# Patient Record
Sex: MALE | Race: BLACK OR AFRICAN AMERICAN | HISPANIC OR LATINO | Employment: UNEMPLOYED | URBAN - METROPOLITAN AREA
[De-identification: names, ages, dates, MRNs, and addresses within clinical notes are randomized per-mention and may not be internally consistent; named-entity substitution may affect disease eponyms.]

---

## 2017-01-01 ENCOUNTER — HOSPITAL ENCOUNTER (EMERGENCY)
Facility: HOSPITAL | Age: 27
Discharge: HOME | End: 2017-01-01
Attending: EMERGENCY MEDICINE | Admitting: EMERGENCY MEDICINE
Payer: COMMERCIAL

## 2017-01-01 VITALS
TEMPERATURE: 98.1 F | WEIGHT: 237.22 LBS | HEART RATE: 85 BPM | DIASTOLIC BLOOD PRESSURE: 89 MMHG | OXYGEN SATURATION: 98 % | RESPIRATION RATE: 16 BRPM | SYSTOLIC BLOOD PRESSURE: 137 MMHG

## 2017-01-01 DIAGNOSIS — J01.90 ACUTE SINUSITIS: Primary | ICD-10-CM

## 2017-01-01 PROCEDURE — 99283 EMERGENCY DEPT VISIT LOW MDM: CPT

## 2017-01-01 RX ORDER — GUAIFENESIN 600 MG
600 TABLET, EXTENDED RELEASE 12 HR ORAL 2 TIMES DAILY
Qty: 10 TABLET | Refills: 0 | Status: SHIPPED | OUTPATIENT
Start: 2017-01-01 | End: 2017-01-06

## 2017-01-01 RX ORDER — NAPROXEN 250 MG/1
250 TABLET ORAL
Qty: 90 TABLET | Refills: 0 | Status: SHIPPED | OUTPATIENT
Start: 2017-01-01 | End: 2017-07-25 | Stop reason: ALTCHOICE

## 2017-01-01 RX ORDER — AZITHROMYCIN 250 MG/1
TABLET, FILM COATED ORAL
Qty: 6 TABLET | Refills: 0 | Status: SHIPPED | OUTPATIENT
Start: 2017-01-01 | End: 2017-07-25 | Stop reason: ALTCHOICE

## 2017-07-25 ENCOUNTER — HOSPITAL ENCOUNTER (EMERGENCY)
Facility: HOSPITAL | Age: 27
Discharge: HOME | End: 2017-07-25
Attending: EMERGENCY MEDICINE | Admitting: EMERGENCY MEDICINE
Payer: COMMERCIAL

## 2017-07-25 VITALS
SYSTOLIC BLOOD PRESSURE: 151 MMHG | DIASTOLIC BLOOD PRESSURE: 83 MMHG | RESPIRATION RATE: 18 BRPM | WEIGHT: 230 LBS | TEMPERATURE: 97.9 F | HEART RATE: 83 BPM | OXYGEN SATURATION: 98 %

## 2017-07-25 DIAGNOSIS — R51.9 HEADACHE: Primary | ICD-10-CM

## 2017-07-25 DIAGNOSIS — H60.509 OTITIS EXTERNA; ACUTE: ICD-10-CM

## 2017-07-25 PROCEDURE — 96361 HYDRATE IV INFUSION ADD-ON: CPT

## 2017-07-25 PROCEDURE — 99283 EMERGENCY DEPT VISIT LOW MDM: CPT

## 2017-07-25 PROCEDURE — 96375 TX/PRO/DX INJ NEW DRUG ADDON: CPT

## 2017-07-25 PROCEDURE — 96374 THER/PROPH/DIAG INJ IV PUSH: CPT

## 2017-07-25 RX ORDER — KETOROLAC TROMETHAMINE 30 MG/ML
15 INJECTION, SOLUTION INTRAMUSCULAR; INTRAVENOUS ONCE
Status: COMPLETED | OUTPATIENT
Start: 2017-07-25 | End: 2017-07-25

## 2017-07-25 RX ORDER — METOCLOPRAMIDE HYDROCHLORIDE 5 MG/ML
10 INJECTION INTRAMUSCULAR; INTRAVENOUS ONCE
Status: COMPLETED | OUTPATIENT
Start: 2017-07-25 | End: 2017-07-25

## 2017-07-25 RX ORDER — CIPROFLOXACIN AND DEXAMETHASONE 3; 1 MG/ML; MG/ML
4 SUSPENSION/ DROPS AURICULAR (OTIC) ONCE
Status: COMPLETED | OUTPATIENT
Start: 2017-07-25 | End: 2017-07-25

## 2017-07-25 RX ORDER — NAPROXEN 500 MG/1
500 TABLET ORAL 2 TIMES DAILY WITH MEALS
Qty: 20 TABLET | Refills: 0 | Status: SHIPPED | OUTPATIENT
Start: 2017-07-25 | End: 2018-08-15 | Stop reason: ALTCHOICE

## 2017-07-25 RX ORDER — DIPHENHYDRAMINE HYDROCHLORIDE 50 MG/ML
25 INJECTION INTRAMUSCULAR; INTRAVENOUS ONCE
Status: COMPLETED | OUTPATIENT
Start: 2017-07-25 | End: 2017-07-25

## 2017-07-25 RX ADMIN — DIPHENHYDRAMINE HYDROCHLORIDE 25 MG: 50 INJECTION, SOLUTION INTRAMUSCULAR; INTRAVENOUS at 00:53

## 2017-07-25 RX ADMIN — METOCLOPRAMIDE 10 MG: 5 INJECTION, SOLUTION INTRAMUSCULAR; INTRAVENOUS at 00:58

## 2017-07-25 RX ADMIN — SODIUM CHLORIDE 1000 ML: 0.9 INJECTION, SOLUTION INTRAVENOUS at 00:51

## 2017-07-25 RX ADMIN — KETOROLAC TROMETHAMINE 15 MG: 30 INJECTION, SOLUTION INTRAMUSCULAR at 00:52

## 2017-07-25 RX ADMIN — CIPROFLOXACIN AND DEXAMETHASONE 4 DROP: 3; 1 SUSPENSION/ DROPS AURICULAR (OTIC) at 01:37

## 2018-08-15 ENCOUNTER — HOSPITAL ENCOUNTER (EMERGENCY)
Facility: HOSPITAL | Age: 28
Discharge: HOME | End: 2018-08-15
Attending: EMERGENCY MEDICINE
Payer: COMMERCIAL

## 2018-08-15 VITALS
OXYGEN SATURATION: 100 % | RESPIRATION RATE: 16 BRPM | WEIGHT: 242 LBS | HEART RATE: 68 BPM | DIASTOLIC BLOOD PRESSURE: 55 MMHG | SYSTOLIC BLOOD PRESSURE: 106 MMHG | TEMPERATURE: 97.8 F

## 2018-08-15 DIAGNOSIS — G43.909 MIGRAINE HEADACHE: Primary | ICD-10-CM

## 2018-08-15 LAB
ALBUMIN SERPL BCP-MCNC: 4 G/DL (ref 3.5–5)
ALP SERPL-CCNC: 56 U/L (ref 46–116)
ALT SERPL W P-5'-P-CCNC: 33 U/L (ref 12–78)
ANION GAP SERPL CALCULATED.3IONS-SCNC: 7 MMOL/L (ref 4–13)
AST SERPL W P-5'-P-CCNC: 15 U/L (ref 5–45)
BASOPHILS # BLD AUTO: 0.04 THOUSANDS/ΜL (ref 0–0.1)
BASOPHILS NFR BLD AUTO: 1 % (ref 0–1)
BILIRUB SERPL-MCNC: 0.44 MG/DL (ref 0.2–1)
BUN SERPL-MCNC: 12 MG/DL (ref 5–25)
CALCIUM SERPL-MCNC: 9.1 MG/DL (ref 8.3–10.1)
CHLORIDE SERPL-SCNC: 103 MMOL/L (ref 100–108)
CO2 SERPL-SCNC: 28 MMOL/L (ref 21–32)
CREAT SERPL-MCNC: 0.71 MG/DL (ref 0.6–1.3)
EOSINOPHIL # BLD AUTO: 0.21 THOUSAND/ΜL (ref 0–0.61)
EOSINOPHIL NFR BLD AUTO: 3 % (ref 0–6)
ERYTHROCYTE [DISTWIDTH] IN BLOOD BY AUTOMATED COUNT: 12.8 % (ref 11.6–15.1)
GFR SERPL CREATININE-BSD FRML MDRD: 127 ML/MIN/1.73SQ M
GLUCOSE SERPL-MCNC: 93 MG/DL (ref 65–140)
HCT VFR BLD AUTO: 47.3 % (ref 36.5–49.3)
HGB BLD-MCNC: 16.4 G/DL (ref 12–17)
LIPASE SERPL-CCNC: 146 U/L (ref 73–393)
LYMPHOCYTES # BLD AUTO: 1.68 THOUSANDS/ΜL (ref 0.6–4.47)
LYMPHOCYTES NFR BLD AUTO: 24 % (ref 14–44)
MCH RBC QN AUTO: 31.5 PG (ref 26.8–34.3)
MCHC RBC AUTO-ENTMCNC: 34.7 G/DL (ref 31.4–37.4)
MCV RBC AUTO: 91 FL (ref 82–98)
MONOCYTES # BLD AUTO: 0.42 THOUSAND/ΜL (ref 0.17–1.22)
MONOCYTES NFR BLD AUTO: 6 % (ref 4–12)
NEUTROPHILS # BLD AUTO: 4.77 THOUSANDS/ΜL (ref 1.85–7.62)
NEUTS SEG NFR BLD AUTO: 67 % (ref 43–75)
NRBC BLD AUTO-RTO: 0 /100 WBCS
PLATELET # BLD AUTO: 242 THOUSANDS/UL (ref 149–390)
PMV BLD AUTO: 10.6 FL (ref 8.9–12.7)
POTASSIUM SERPL-SCNC: 4 MMOL/L (ref 3.5–5.3)
PROT SERPL-MCNC: 7.7 G/DL (ref 6.4–8.2)
RBC # BLD AUTO: 5.2 MILLION/UL (ref 3.88–5.62)
SODIUM SERPL-SCNC: 138 MMOL/L (ref 136–145)
WBC # BLD AUTO: 7.12 THOUSAND/UL (ref 4.31–10.16)

## 2018-08-15 PROCEDURE — 96361 HYDRATE IV INFUSION ADD-ON: CPT

## 2018-08-15 PROCEDURE — 96374 THER/PROPH/DIAG INJ IV PUSH: CPT

## 2018-08-15 PROCEDURE — 83690 ASSAY OF LIPASE: CPT | Performed by: EMERGENCY MEDICINE

## 2018-08-15 PROCEDURE — 85025 COMPLETE CBC W/AUTO DIFF WBC: CPT | Performed by: EMERGENCY MEDICINE

## 2018-08-15 PROCEDURE — 99284 EMERGENCY DEPT VISIT MOD MDM: CPT

## 2018-08-15 PROCEDURE — 80053 COMPREHEN METABOLIC PANEL: CPT | Performed by: EMERGENCY MEDICINE

## 2018-08-15 PROCEDURE — 96375 TX/PRO/DX INJ NEW DRUG ADDON: CPT

## 2018-08-15 PROCEDURE — 36415 COLL VENOUS BLD VENIPUNCTURE: CPT | Performed by: EMERGENCY MEDICINE

## 2018-08-15 RX ORDER — PANTOPRAZOLE SODIUM 40 MG/1
40 TABLET, DELAYED RELEASE ORAL 2 TIMES DAILY
COMMUNITY
End: 2019-05-16

## 2018-08-15 RX ORDER — DIPHENHYDRAMINE HYDROCHLORIDE 50 MG/ML
25 INJECTION INTRAMUSCULAR; INTRAVENOUS ONCE
Status: COMPLETED | OUTPATIENT
Start: 2018-08-15 | End: 2018-08-15

## 2018-08-15 RX ORDER — METOCLOPRAMIDE HYDROCHLORIDE 5 MG/ML
10 INJECTION INTRAMUSCULAR; INTRAVENOUS ONCE
Status: COMPLETED | OUTPATIENT
Start: 2018-08-15 | End: 2018-08-15

## 2018-08-15 RX ORDER — OLANZAPINE 5 MG/1
10 TABLET, ORALLY DISINTEGRATING ORAL ONCE
Status: COMPLETED | OUTPATIENT
Start: 2018-08-15 | End: 2018-08-15

## 2018-08-15 RX ORDER — SUCRALFATE ORAL 1 G/10ML
1000 SUSPENSION ORAL ONCE
Status: COMPLETED | OUTPATIENT
Start: 2018-08-15 | End: 2018-08-15

## 2018-08-15 RX ORDER — KETOROLAC TROMETHAMINE 30 MG/ML
15 INJECTION, SOLUTION INTRAMUSCULAR; INTRAVENOUS ONCE
Status: COMPLETED | OUTPATIENT
Start: 2018-08-15 | End: 2018-08-15

## 2018-08-15 RX ADMIN — METOCLOPRAMIDE 10 MG: 5 INJECTION, SOLUTION INTRAMUSCULAR; INTRAVENOUS at 13:27

## 2018-08-15 RX ADMIN — DIPHENHYDRAMINE HYDROCHLORIDE 25 MG: 50 INJECTION, SOLUTION INTRAMUSCULAR; INTRAVENOUS at 13:31

## 2018-08-15 RX ADMIN — KETOROLAC TROMETHAMINE 15 MG: 30 INJECTION, SOLUTION INTRAMUSCULAR at 13:30

## 2018-08-15 RX ADMIN — SUCRALFATE 1000 MG: 1 SUSPENSION ORAL at 13:33

## 2018-08-15 RX ADMIN — SODIUM CHLORIDE 1000 ML: 0.9 INJECTION, SOLUTION INTRAVENOUS at 13:20

## 2018-08-15 RX ADMIN — OLANZAPINE 10 MG: 5 TABLET, ORALLY DISINTEGRATING ORAL at 13:32

## 2018-08-15 NOTE — ED PROVIDER NOTES
History  Chief Complaint   Patient presents with    Abdominal Pain     Sharp abdominal pains with nausea  Hx gastritis, but meds not working    Migraine     Migraine posterior headache since yesterday       History provided by:  Patient   used: No    Headache - Recurrent or Known Dx Migraines   Pain location:  Frontal and occipital (BL frontal)  Quality:  Dull  Radiates to:  Does not radiate  Severity currently:  7/10  Severity at highest:  7/10  Onset quality:  Gradual  Duration:  1 day  Timing:  Constant  Progression:  Worsening  Chronicity:  New  Similar to prior headaches: yes    Relieved by:  Nothing  Worsened by:  Nothing  Ineffective treatments:  None tried  Associated symptoms: abdominal pain and nausea    Associated symptoms: no back pain, no cough, no diarrhea, no dizziness, no eye pain, no fatigue, no fever, no neck pain, no neck stiffness, no numbness, no sore throat and no vomiting        Prior to Admission Medications   Prescriptions Last Dose Informant Patient Reported? Taking? pantoprazole (PROTONIX) 40 mg tablet   Yes Yes   Sig: Take 40 mg by mouth 2 (two) times a day      Facility-Administered Medications: None       Past Medical History:   Diagnosis Date    Gastritis     Migraine        History reviewed  No pertinent surgical history  Family History   Problem Relation Age of Onset    Family history unknown: Yes     I have reviewed and agree with the history as documented  Social History   Substance Use Topics    Smoking status: Never Smoker    Smokeless tobacco: Never Used    Alcohol use No        Review of Systems   Constitutional: Negative for activity change, appetite change, diaphoresis, fatigue and fever  HENT: Negative for sore throat and trouble swallowing  Eyes: Negative for pain and visual disturbance  Respiratory: Negative for cough, chest tightness and shortness of breath  Cardiovascular: Negative for chest pain     Gastrointestinal: Positive for abdominal pain and nausea  Negative for diarrhea and vomiting  Endocrine: Negative for polyphagia and polyuria  Genitourinary: Negative for dysuria, flank pain, frequency, hematuria and urgency  Musculoskeletal: Negative for back pain, gait problem, neck pain and neck stiffness  Skin: Negative for color change and rash  Allergic/Immunologic: Negative for immunocompromised state  Neurological: Positive for headaches  Negative for dizziness, speech difficulty and numbness  Hematological: Does not bruise/bleed easily  Psychiatric/Behavioral: Negative for confusion and decreased concentration  Physical Exam  Physical Exam   Constitutional: He is oriented to person, place, and time  He appears well-developed and well-nourished  HENT:   Head: Normocephalic  Eyes: Conjunctivae and EOM are normal  Pupils are equal, round, and reactive to light  No scleral icterus  Neck: Normal range of motion  Neck supple  Cardiovascular: Normal rate and regular rhythm  Pulmonary/Chest: Effort normal and breath sounds normal  No respiratory distress  Abdominal: Soft  Bowel sounds are normal  He exhibits no distension  There is no tenderness  There is no rebound and no guarding  Musculoskeletal: Normal range of motion  He exhibits no tenderness or deformity  Lymphadenopathy:     He has no cervical adenopathy  Neurological: He is alert and oriented to person, place, and time  Coordination normal    GCS 15, nonfocal  5/5 strength in all four extremities  Normal cerebellar testing  Ambulates without difficulty  Skin: Skin is warm and dry  He is not diaphoretic  Psychiatric: He has a normal mood and affect  Vitals reviewed        Vital Signs  ED Triage Vitals   Temperature Pulse Respirations Blood Pressure SpO2   08/15/18 1241 08/15/18 1241 08/15/18 1241 08/15/18 1241 08/15/18 1241   98 2 °F (36 8 °C) 65 16 147/96 100 %      Temp Source Heart Rate Source Patient Position - Orthostatic VS BP Location FiO2 (%)   08/15/18 1241 08/15/18 1500 08/15/18 1400 08/15/18 1400 --   Oral Monitor Lying Left arm       Pain Score       08/15/18 1241       Worst Possible Pain           Vitals:    08/15/18 1241 08/15/18 1400 08/15/18 1500   BP: 147/96 132/72 106/55   Pulse: 65 69 68   Patient Position - Orthostatic VS:  Lying Lying       Visual Acuity      ED Medications  Medications   sodium chloride 0 9 % bolus 1,000 mL (0 mL Intravenous Stopped 8/15/18 1507)   ketorolac (TORADOL) injection 15 mg (15 mg Intravenous Given 8/15/18 1330)   metoclopramide (REGLAN) injection 10 mg (10 mg Intravenous Given 8/15/18 1327)   diphenhydrAMINE (BENADRYL) injection 25 mg (25 mg Intravenous Given 8/15/18 1331)   sucralfate (CARAFATE) oral suspension 1,000 mg (1,000 mg Oral Given 8/15/18 1333)   OLANZapine (ZyPREXA ZYDIS) dispersible tablet 10 mg (10 mg Oral Given 8/15/18 1332)       Diagnostic Studies  Results Reviewed     Procedure Component Value Units Date/Time    Comprehensive metabolic panel [[id number]] Collected:  08/15/18 1315    Lab Status:  Final result Specimen:  Blood from Arm, Right Updated:  08/15/18 1349     Sodium 138 mmol/L      Potassium 4 0 mmol/L      Chloride 103 mmol/L      CO2 28 mmol/L      Anion Gap 7 mmol/L      BUN 12 mg/dL      Creatinine 0 71 mg/dL      Glucose 93 mg/dL      Calcium 9 1 mg/dL      AST 15 U/L      ALT 33 U/L      Alkaline Phosphatase 56 U/L      Total Protein 7 7 g/dL      Albumin 4 0 g/dL      Total Bilirubin 0 44 mg/dL      eGFR 127 ml/min/1 73sq m     Narrative:         National Kidney Disease Education Program recommendations are as follows:  GFR calculation is accurate only with a steady state creatinine  Chronic Kidney disease less than 60 ml/min/1 73 sq  meters  Kidney failure less than 15 ml/min/1 73 sq  meters      Lipase [[id number]]  (Normal) Collected:  08/15/18 1315    Lab Status:  Final result Specimen:  Blood from Arm, Right Updated:  08/15/18 1349     Lipase 146 u/L CBC and differential [[id number]] Collected:  08/15/18 1315    Lab Status:  Final result Specimen:  Blood from Arm, Right Updated:  08/15/18 1329     WBC 7 12 Thousand/uL      RBC 5 20 Million/uL      Hemoglobin 16 4 g/dL      Hematocrit 47 3 %      MCV 91 fL      MCH 31 5 pg      MCHC 34 7 g/dL      RDW 12 8 %      MPV 10 6 fL      Platelets 704 Thousands/uL      nRBC 0 /100 WBCs      Neutrophils Relative 67 %      Lymphocytes Relative 24 %      Monocytes Relative 6 %      Eosinophils Relative 3 %      Basophils Relative 1 %      Neutrophils Absolute 4 77 Thousands/µL      Lymphocytes Absolute 1 68 Thousands/µL      Monocytes Absolute 0 42 Thousand/µL      Eosinophils Absolute 0 21 Thousand/µL      Basophils Absolute 0 04 Thousands/µL                  No orders to display              Procedures  Procedures       Phone Contacts  ED Phone Contact    ED Course  ED Course as of Aug 15 1549   Wed Aug 15, 2018   1509 Patient re-evaluated  Reports that headache is now 1/10 in intensity  Abdominal discomfort much improved  Minimal nausea  Continues to have a normal neurologic exam and benign abdominal exam   Patient requesting to be discharged  Will oblige  Recommend PCP follow-up and return for new or worsening symptoms  MDM  Number of Diagnoses or Management Options  Migraine headache: new and requires workup  Diagnosis management comments: 32 y/o male presents to the emergency department for evaluation of multiple complaints  The patient states that starting last evening around 18:00 he began to have a gradual onset posterior headache with some radiation to his frontal areas bilaterally  Patient states that he has a long history of migraine headaches and that his current symptoms including abdominal pain, nausea, and photosensitivity ER consistent with previous episodes of migraine    Patient states that he has had multiple headaches in the past that were "10 times worse" that his current symptoms  Also describes mild cramping pain in the epigastrium that he has been experiencing intermittently for some time  He is followed by GI and recently had EGD and is taking daily PPIs  No fevers  No emesis  No diarrhea  43-year-old male presented for the above  He had a normal neurologic exam   Has history of similar headaches in the past   No "red flags" on history or physical exam   Laboratory studies were conducted to rule out biliary obstructive disease or pancreatitis  The patient was treated symptomatically in the emergency department and had excellent relief of his symptoms  Will discharge to follow up with PCP       Amount and/or Complexity of Data Reviewed  Clinical lab tests: reviewed and ordered  Tests in the radiology section of CPT®: reviewed and ordered  Review and summarize past medical records: yes      CritCare Time    Disposition  Final diagnoses:   Migraine headache     Time reflects when diagnosis was documented in both MDM as applicable and the Disposition within this note     Time User Action Codes Description Comment    8/15/2018  3:01 PM Jasson Vasquez [G43 [id number]] Migraine headache       ED Disposition     ED Disposition Condition Comment    Discharge  Reese Mealy discharge to home/self care  Condition at discharge: Good        Follow-up Information     Follow up With Specialties Details Why [address], [place] Family Medicine  please follow up with your primary care provider in 3-5 days or sooner if your symptoms persist  If you have new or worsening symptoms please call your doctor or return to the emergency department  [facility] [address]            Discharge Medication List as of 8/15/2018  3:03 PM      CONTINUE these medications which have NOT CHANGED    Details   pantoprazole (PROTONIX) 40 mg tablet Take 40 mg by mouth 2 (two) times a day, Historical Med           No discharge procedures on file      ED Provider  Electronically Signed by           Hattie Sloan MD  08/15/18 7513

## 2018-08-15 NOTE — DISCHARGE INSTRUCTIONS
Migraine Headache   WHAT YOU SHOULD KNOW:   A migraine is a severe headache  The pain can be so severe that it interferes with your daily activities  A migraine can last a few hours up to several days  The exact cause of migraines is not known  It may be caused by changes in your body chemicals and extra sensitive nerves in your brain  AFTER YOU LEAVE:   Medicines:  Take medicine as soon as you feel a migraine begin  · Pain medicine: You may need medicine to take away or decrease pain  You may need a doctor's order for this medicine  Do not wait until the pain is severe before you take your medicine  · Migraine medicines: These are used to help prevent a migraine or stop it once it starts  · Antinausea medicine: This medicine may be given to calm your stomach and to help prevent vomiting  They can also help relieve pain  · Take your medicine as directed  Call your healthcare provider if you think your medicine is not helping or if you have side effects  Tell him if you are allergic to any medicine  Keep a list of the medicines, vitamins, and herbs you take  Include the amounts, and when and why you take them  Bring the list or the pill bottles to follow-up visits  Carry your medicine list with you in case of an emergency  Manage your symptoms:   · Rest:  Rest in a dark, quiet room  This will help decrease your pain  · Ice:  Ice helps decrease pain  Use an ice pack or put crushed ice in a plastic bag  Cover the ice pack with a towel and place it on your head where it hurts for 15 to 20 minutes every hour  · Heat:  Heat helps decrease pain and muscle spasms  Use a small towel dampened with warm water or a heating pad, or sit in a warm bath  Apply heat on the area for 20 to 30 minutes every 2 hours  You may alternate heat and ice  Keep a headache diary:  Write down when your migraines start and stop  Include your symptoms and what you were doing when a migraine began   Record what you ate or drank for 24 hours before the migraine started  Describe the pain and where it hurts  Keep track of what you did to treat your migraine and whether it worked  Follow up with your primary healthcare provider or neurologist as directed:  Bring your headache diary with you when you see your primary healthcare provider  Write down your questions so you remember to ask them during your visits  Prevent another migraine:   · Do not smoke: If you smoke, it is never too late to quit  Tobacco smoke can trigger a migraine  It can also cause heart disease, lung disease, cancer, and other health problems  Quitting smoking will improve your health and the health of those around you  If you smoke, ask for information about how to stop  · Do not drink alcohol:  Alcohol can trigger a migraine  It can also interfere with the medicines used to treat your migraine  · Get regular exercise:  Exercise may help prevent migraines  Talk to your primary healthcare provider about the best exercise plan for you  · Manage stress:  Stress may trigger a migraine  Learn new ways to relax, such as deep breathing  · Stick to a sleep schedule:  Go to bed and get up at the same time each day  · Eat regular meals:  Include healthy foods such as include fruit, vegetables, whole-grain breads, low-fat dairy products, beans, lean meat, and fish  Avoid trigger foods like chocolate, hard cheese, and red wine  Foods that contain gluten, nitrates, MSG, or artificial sweeteners may also trigger migraines  Caffeine, which is often used to treat migraines, can also trigger them  Contact your primary healthcare provider or neurologist if:   · You have a fever  · Your migraines interfere with your daily activities  · Your medicines or treatments stop working  · You have questions about your condition or care    Seek care immediately or call 911 if:   · You have a headache that seems different or much worse than your usual migraine headache  · You have a severe headache with a fever or a stiff neck  · You have new problems with speech, vision, balance, or movement  · You feel like you are going to faint, you become confused, or you have a seizure  © 2014 [address] is for End User's use only and may not be sold, redistributed or otherwise used for commercial purposes  All illustrations and images included in CareNotes® are the copyrighted property of A D A M [employer] Inc  or Ryan Hutson  The above information is an  only  It is not intended as medical advice for individual conditions or treatments  Talk to your doctor, nurse or pharmacist before following any medical regimen to see if it is safe and effective for you

## 2019-05-16 ENCOUNTER — APPOINTMENT (EMERGENCY)
Dept: RADIOLOGY | Facility: HOSPITAL | Age: 29
End: 2019-05-16
Payer: COMMERCIAL

## 2019-05-16 ENCOUNTER — HOSPITAL ENCOUNTER (EMERGENCY)
Facility: HOSPITAL | Age: 29
Discharge: HOME | End: 2019-05-16
Attending: EMERGENCY MEDICINE | Admitting: EMERGENCY MEDICINE
Payer: COMMERCIAL

## 2019-05-16 VITALS
OXYGEN SATURATION: 95 % | HEART RATE: 116 BPM | RESPIRATION RATE: 18 BRPM | DIASTOLIC BLOOD PRESSURE: 59 MMHG | SYSTOLIC BLOOD PRESSURE: 115 MMHG | TEMPERATURE: 97.6 F

## 2019-05-16 DIAGNOSIS — J45.909 ASTHMA: ICD-10-CM

## 2019-05-16 DIAGNOSIS — J20.9 ACUTE BRONCHITIS: Primary | ICD-10-CM

## 2019-05-16 PROCEDURE — 94640 AIRWAY INHALATION TREATMENT: CPT

## 2019-05-16 PROCEDURE — 96374 THER/PROPH/DIAG INJ IV PUSH: CPT

## 2019-05-16 PROCEDURE — 71046 X-RAY EXAM CHEST 2 VIEWS: CPT

## 2019-05-16 PROCEDURE — 99283 EMERGENCY DEPT VISIT LOW MDM: CPT

## 2019-05-16 PROCEDURE — 96361 HYDRATE IV INFUSION ADD-ON: CPT

## 2019-05-16 PROCEDURE — 99284 EMERGENCY DEPT VISIT MOD MDM: CPT | Performed by: EMERGENCY MEDICINE

## 2019-05-16 RX ORDER — GUAIFENESIN 600 MG
600 TABLET, EXTENDED RELEASE 12 HR ORAL 2 TIMES DAILY
Qty: 10 TABLET | Refills: 0 | Status: SHIPPED | OUTPATIENT
Start: 2019-05-16 | End: 2019-05-21

## 2019-05-16 RX ORDER — FLUTICASONE PROPIONATE 50 MCG
1 SPRAY, SUSPENSION (ML) NASAL DAILY
Qty: 16 G | Refills: 0 | Status: SHIPPED | OUTPATIENT
Start: 2019-05-16

## 2019-05-16 RX ORDER — IPRATROPIUM BROMIDE AND ALBUTEROL SULFATE 2.5; .5 MG/3ML; MG/3ML
3 SOLUTION RESPIRATORY (INHALATION) ONCE
Status: COMPLETED | OUTPATIENT
Start: 2019-05-16 | End: 2019-05-16

## 2019-05-16 RX ORDER — ALBUTEROL SULFATE 2.5 MG/3ML
5 SOLUTION RESPIRATORY (INHALATION) ONCE
Status: COMPLETED | OUTPATIENT
Start: 2019-05-16 | End: 2019-05-16

## 2019-05-16 RX ORDER — KETOROLAC TROMETHAMINE 30 MG/ML
15 INJECTION, SOLUTION INTRAMUSCULAR; INTRAVENOUS ONCE
Status: COMPLETED | OUTPATIENT
Start: 2019-05-16 | End: 2019-05-16

## 2019-05-16 RX ORDER — ALBUTEROL SULFATE 90 UG/1
2 AEROSOL, METERED RESPIRATORY (INHALATION) EVERY 6 HOURS PRN
COMMUNITY

## 2019-05-16 RX ORDER — ALBUTEROL SULFATE 2.5 MG/3ML
SOLUTION RESPIRATORY (INHALATION)
Status: COMPLETED
Start: 2019-05-16 | End: 2019-05-16

## 2019-05-16 RX ADMIN — KETOROLAC TROMETHAMINE 15 MG: 30 INJECTION, SOLUTION INTRAMUSCULAR; INTRAVENOUS at 10:17

## 2019-05-16 RX ADMIN — IPRATROPIUM BROMIDE 0.5 MG: 0.5 SOLUTION RESPIRATORY (INHALATION) at 09:33

## 2019-05-16 RX ADMIN — SODIUM CHLORIDE 1000 ML: 0.9 INJECTION, SOLUTION INTRAVENOUS at 10:17

## 2019-05-16 RX ADMIN — ALBUTEROL SULFATE 5 MG: 2.5 SOLUTION RESPIRATORY (INHALATION) at 09:33

## 2019-05-16 RX ADMIN — IPRATROPIUM BROMIDE AND ALBUTEROL SULFATE 3 ML: 2.5; .5 SOLUTION RESPIRATORY (INHALATION) at 10:11

## 2019-05-16 RX ADMIN — Medication 0.5 MG: at 09:33

## 2020-01-06 ENCOUNTER — HOSPITAL ENCOUNTER (EMERGENCY)
Facility: HOSPITAL | Age: 30
Discharge: HOME | End: 2020-01-06
Attending: EMERGENCY MEDICINE | Admitting: EMERGENCY MEDICINE
Payer: COMMERCIAL

## 2020-01-06 VITALS
RESPIRATION RATE: 18 BRPM | HEART RATE: 84 BPM | WEIGHT: 225.53 LBS | TEMPERATURE: 97.6 F | SYSTOLIC BLOOD PRESSURE: 120 MMHG | DIASTOLIC BLOOD PRESSURE: 77 MMHG | OXYGEN SATURATION: 98 %

## 2020-01-06 DIAGNOSIS — R11.2 NAUSEA AND VOMITING: ICD-10-CM

## 2020-01-06 DIAGNOSIS — R10.9 ABDOMINAL PAIN: Primary | ICD-10-CM

## 2020-01-06 DIAGNOSIS — R19.7 DIARRHEA: ICD-10-CM

## 2020-01-06 LAB
BILIRUB UR QL STRIP: NEGATIVE
CLARITY UR: CLEAR
COLOR UR: YELLOW
COLOR, POC: YELLOW
GLUCOSE UR STRIP-MCNC: NEGATIVE MG/DL
HGB UR QL STRIP.AUTO: NEGATIVE
KETONES UR STRIP-MCNC: NEGATIVE MG/DL
LEUKOCYTE ESTERASE UR QL STRIP: NEGATIVE
NITRITE UR QL STRIP: NEGATIVE
PH UR STRIP.AUTO: 7.5 [PH] (ref 4.5–8)
PROT UR STRIP-MCNC: NEGATIVE MG/DL
SP GR UR STRIP.AUTO: 1.02 (ref 1–1.03)
UROBILINOGEN UR QL STRIP.AUTO: 0.2 E.U./DL

## 2020-01-06 PROCEDURE — 99284 EMERGENCY DEPT VISIT MOD MDM: CPT | Performed by: EMERGENCY MEDICINE

## 2020-01-06 PROCEDURE — 99284 EMERGENCY DEPT VISIT MOD MDM: CPT

## 2020-01-06 PROCEDURE — 81003 URINALYSIS AUTO W/O SCOPE: CPT

## 2020-01-06 RX ORDER — DICYCLOMINE HCL 20 MG
20 TABLET ORAL ONCE
Status: COMPLETED | OUTPATIENT
Start: 2020-01-06 | End: 2020-01-06

## 2020-01-06 RX ORDER — PANTOPRAZOLE SODIUM 40 MG/1
40 TABLET, DELAYED RELEASE ORAL DAILY
COMMUNITY

## 2020-01-06 RX ORDER — ONDANSETRON 2 MG/ML
4 INJECTION INTRAMUSCULAR; INTRAVENOUS ONCE
Status: COMPLETED | OUTPATIENT
Start: 2020-01-06 | End: 2020-01-06

## 2020-01-06 RX ORDER — DICYCLOMINE HCL 20 MG
20 TABLET ORAL 2 TIMES DAILY
Qty: 20 TABLET | Refills: 0 | Status: SHIPPED | OUTPATIENT
Start: 2020-01-06 | End: 2021-07-18

## 2020-01-06 RX ORDER — HALOPERIDOL 5 MG/ML
5 INJECTION INTRAMUSCULAR ONCE
Status: COMPLETED | OUTPATIENT
Start: 2020-01-06 | End: 2020-01-06

## 2020-01-06 RX ORDER — ONDANSETRON 4 MG/1
4 TABLET, ORALLY DISINTEGRATING ORAL EVERY 6 HOURS PRN
Qty: 20 TABLET | Refills: 0 | Status: SHIPPED | OUTPATIENT
Start: 2020-01-06 | End: 2021-07-18

## 2020-01-06 RX ADMIN — HALOPERIDOL LACTATE 5 MG: 5 INJECTION INTRAMUSCULAR at 13:31

## 2020-01-06 RX ADMIN — FAMOTIDINE 20 MG: 10 INJECTION, SOLUTION INTRAVENOUS at 11:27

## 2020-01-06 RX ADMIN — ONDANSETRON 4 MG: 2 INJECTION INTRAMUSCULAR; INTRAVENOUS at 11:26

## 2020-01-06 RX ADMIN — SODIUM CHLORIDE 1000 ML: 0.9 INJECTION, SOLUTION INTRAVENOUS at 11:25

## 2020-01-06 RX ADMIN — DICYCLOMINE HYDROCHLORIDE 20 MG: 20 TABLET ORAL at 13:32

## 2020-01-06 NOTE — ED PROVIDER NOTES
History  Chief Complaint   Patient presents with    Abdominal Pain     pt c/o sharp abd pain for 2 days, reports vomiting, diarrhea, and subjective fever  pt taking protonix without relief  This is a 58-year-old male with a history of gastritis and asthma who presents with nausea/vomiting, diarrhea, abdominal pain  The patient states that for the past several years, he has been suffering from intermittent epigastric abdominal pain associated with nonbloody, nonbilious vomiting  The patient states that he has seen GI in the past and received an EGD which was unremarkable  He is currently taking Protonix  Starting this morning, he started to experience innumerable episodes of nonbloody, nonbilious vomiting and innumerable episodes of nonbloody, nonmelanotic diarrhea  This is associated with abdominal cramping  The patient states that his girlfriend has been experiencing similar symptoms  She is actually in the emergency department at this point for evaluation as well  The patient has not spoken with his gastroenterologist or his family doctor regarding his ongoing epigastric abdominal pain  Denies fever/chills, lightheadedness/dizziness, numbness/weakness, headache, change in vision, URI symptoms, neck pain, chest pain, palpitations, shortness of breath, cough, back pain, flank pain, hematochezia, melena, dysuria, hematuria  Prior to Admission Medications   Prescriptions Last Dose Informant Patient Reported? Taking?    albuterol (PROVENTIL HFA,VENTOLIN HFA) 90 mcg/act inhaler   Yes Yes   Sig: Inhale 2 puffs every 6 (six) hours as needed for wheezing   fluticasone (FLONASE) 50 mcg/act nasal spray   No Yes   Si spray into each nostril daily   pantoprazole (PROTONIX) 40 mg tablet 2020 at Unknown time  Yes Yes   Sig: Take 40 mg by mouth daily      Facility-Administered Medications: None       Past Medical History:   Diagnosis Date    Asthma     Gastritis     Migraine        Past Surgical History:   Procedure Laterality Date    WRIST SURGERY         Family History   Family history unknown: Yes     I have reviewed and agree with the history as documented  Social History     Tobacco Use    Smoking status: Current Some Day Smoker    Smokeless tobacco: Never Used   Substance Use Topics    Alcohol use: Yes    Drug use: No        Review of Systems   Constitutional: Negative for chills, fatigue and fever  HENT: Negative for rhinorrhea, sore throat and trouble swallowing  Eyes: Negative for photophobia and visual disturbance  Respiratory: Negative for cough, chest tightness and shortness of breath  Cardiovascular: Negative for chest pain, palpitations and leg swelling  Gastrointestinal: Positive for abdominal pain, diarrhea, nausea and vomiting  Negative for blood in stool  Endocrine: Negative for polyuria  Genitourinary: Negative for dysuria, flank pain and hematuria  Musculoskeletal: Negative for back pain and neck pain  Skin: Negative for color change and rash  Allergic/Immunologic: Negative for immunocompromised state  Neurological: Negative for dizziness, weakness, light-headedness, numbness and headaches  All other systems reviewed and are negative  Physical Exam  Physical Exam   Constitutional: Vital signs are normal  He appears well-developed and well-nourished  He is cooperative  No distress  HENT:   Mouth/Throat: Uvula is midline and oropharynx is clear and moist    Eyes: Pupils are equal, round, and reactive to light  Conjunctivae, EOM and lids are normal    Neck: Trachea normal  No thyroid mass and no thyromegaly present  Cardiovascular: Normal rate, regular rhythm, normal heart sounds, intact distal pulses and normal pulses  No murmur heard  Pulmonary/Chest: Effort normal and breath sounds normal    Abdominal: Soft  Normal appearance and bowel sounds are normal  There is generalized tenderness   There is no rebound, no guarding, no CVA tenderness and negative Lux's sign  Mild, generalized abdominal tenderness  Neurological: He is alert  Skin: Skin is warm, dry and intact  Psychiatric: He has a normal mood and affect   His speech is normal and behavior is normal  Thought content normal        Vital Signs  ED Triage Vitals   Temperature Pulse Respirations Blood Pressure SpO2   01/06/20 1046 01/06/20 1045 01/06/20 1045 01/06/20 1045 01/06/20 1045   97 6 °F (36 4 °C) 79 18 148/86 99 %      Temp Source Heart Rate Source Patient Position - Orthostatic VS BP Location FiO2 (%)   01/06/20 1046 01/06/20 1045 01/06/20 1045 01/06/20 1045 --   Temporal Monitor Sitting Right arm       Pain Score       01/06/20 1201       3           Vitals:    01/06/20 1201 01/06/20 1329 01/06/20 1409 01/06/20 1448   BP: 122/58 110/69 109/78 120/77   Pulse: 78 68 86 84   Patient Position - Orthostatic VS: Lying Lying Lying Lying         Visual Acuity      ED Medications  Medications   sodium chloride 0 9 % bolus 1,000 mL (0 mL Intravenous Stopped 1/6/20 1250)   ondansetron (ZOFRAN) injection 4 mg (4 mg Intravenous Given 1/6/20 1126)   famotidine (PEPCID) injection 20 mg (20 mg Intravenous Given 1/6/20 1127)   dicyclomine (BENTYL) tablet 20 mg (20 mg Oral Given 1/6/20 1332)   haloperidol lactate (HALDOL) injection 5 mg (5 mg Intramuscular Given 1/6/20 1331)       Diagnostic Studies  Results Reviewed     Procedure Component Value Units Date/Time    POCT urinalysis dipstick [[id number]]  (Normal) Resulted:  01/06/20 1113    Lab Status:  Final result Updated:  01/06/20 1113     Color, UA Yellow    Urine Macroscopic, POC [[id number]] Collected:  01/06/20 1107    Lab Status:  Final result Specimen:  Urine Updated:  01/06/20 1108     Color, UA Yellow     Clarity, UA Clear     pH, UA 7 5     Leukocytes, UA Negative     Nitrite, UA Negative     Protein, UA Negative mg/dl      Glucose, UA Negative mg/dl      Ketones, UA Negative mg/dl      Urobilinogen, UA 0 2 E U /dl      Bilirubin, UA Negative     Blood, UA Negative     Specific Gravity, UA 1 020    Narrative:       CLINITEK RESULT                 No orders to display              Procedures  Procedures         ED Course  ED Course as of Jan 06 1532   Mon Jan 06, 2020   1324 Patient still complaining of abdominal pain  Will give bentyl and haldol  [facility] patient  Sleeping comfortably  States that he feels much better  Will discharge the patient with Zofran and Bentyl for his symptoms  Work note  Patient instructed on the importance of following up with Gastroenterology  Told to continue the Protonix as prescribed  Strict return precautions given  MDM  Number of Diagnoses or Management Options  Diagnosis management comments: Likely a viral gastroenteritis given the patient's symptoms and sick contact  Plan to treat the patient symptomatically with fluids and Zofran  Pepcid for his history of gastritis  The patient needs to follow up with his gastroenterologist and family doctor regarding his chronic abdominal pain  Disposition  Final diagnoses:   Abdominal pain   Nausea and vomiting   Diarrhea     Time reflects when diagnosis was documented in both MDM as applicable and the Disposition within this note     Time User Action Codes Description Comment    1/6/2020  2:44 PM Nicolasa FERRARA Add [R10 9] Abdominal pain     1/6/2020  2:44 PM Nicolasa FERRARA Add [R11 2] Nausea and vomiting     1/6/2020  2:44 PM Alysha Lazo Add [R19 7] Diarrhea       ED Disposition     ED Disposition Condition Date/Time Comment    Discharge Stable Mon Jan 6, 2020  2:44 PM Harris Cabot discharge to home/self care              Follow-up Information     Follow up With Specialties Details Why Contact Info Additional Information    Salima Earl MD Family Medicine Schedule an appointment as soon as possible for a visit   [address] [place] [address] [facility] Emergency Department Emergency Medicine Go to  If symptoms worsen [place] [id number]  [phone] [place] [facility], [address]  , [place], [place], [address] [facility] Gastroenterology Schedule an appointment as soon as possible for a visit   [address]  [address] [facility] [address]  Alisha Joseph [address] [facility], [address], [address], [place], [place], [address] [phone]    [employer]  Call  if you need help finding gastroenterologist [phone]             Discharge Medication List as of 1/6/2020  2:46 PM      START taking these medications    Details   dicyclomine (BENTYL) 20 mg tablet Take 1 tablet (20 mg total) by mouth 2 (two) times a day, Starting Mon 1/6/2020, Normal      ondansetron (ZOFRAN-ODT) 4 mg disintegrating tablet Take 1 tablet (4 mg total) by mouth every 6 (six) hours as needed for nausea, Starting Mon 1/6/2020, Normal         CONTINUE these medications which have NOT CHANGED    Details   albuterol (PROVENTIL HFA,VENTOLIN HFA) 90 mcg/act inhaler Inhale 2 puffs every 6 (six) hours as needed for wheezing, Historical Med      fluticasone (FLONASE) 50 mcg/act nasal spray 1 spray into each nostril daily, Starting u 5/16/2019, Print      pantoprazole (PROTONIX) 40 mg tablet Take 40 mg by mouth daily, Historical Med           No discharge procedures on file      ED Provider  Electronically Signed by           Iam Lagunas MD  01/06/20 3702

## 2021-05-20 ENCOUNTER — HOSPITAL ENCOUNTER (EMERGENCY)
Facility: HOSPITAL | Age: 31
Discharge: HOME | End: 2021-05-20
Attending: EMERGENCY MEDICINE

## 2021-05-20 VITALS
RESPIRATION RATE: 16 BRPM | HEIGHT: 70 IN | OXYGEN SATURATION: 100 % | WEIGHT: 207.23 LBS | HEART RATE: 70 BPM | DIASTOLIC BLOOD PRESSURE: 81 MMHG | TEMPERATURE: 98.2 F | BODY MASS INDEX: 29.67 KG/M2 | SYSTOLIC BLOOD PRESSURE: 141 MMHG

## 2021-05-20 DIAGNOSIS — K13.79 ORAL PAIN: ICD-10-CM

## 2021-05-20 DIAGNOSIS — K11.6 SALIVARY MUCOCELE OR RANULA: ICD-10-CM

## 2021-05-20 DIAGNOSIS — K13.70 ORAL LESION: Primary | ICD-10-CM

## 2021-05-20 PROCEDURE — 99282 EMERGENCY DEPT VISIT SF MDM: CPT | Performed by: PHYSICIAN ASSISTANT

## 2021-05-20 PROCEDURE — 99282 EMERGENCY DEPT VISIT SF MDM: CPT

## 2021-05-21 NOTE — ED PROVIDER NOTES
History  Chief Complaint   Patient presents with    Oral Pain     Pt c/o blister under his tongue for 1 week     27-year-old male with no relevant past medical history who presents to the emergency department for complaint of oral lesion noticed 1 week ago  Patient denies experiencing this before  He reports a "bubble" on the right side floor of mouth, describes swelling as waxing and waning, denies bleeding or discharge  Further denies any difficulty or painful swallowing, other oral lesions, fever/chills, nausea/vomiting, difficulty breathing  Prior to Admission Medications   Prescriptions Last Dose Informant Patient Reported? Taking? albuterol (PROVENTIL HFA,VENTOLIN HFA) 90 mcg/act inhaler   Yes No   Sig: Inhale 2 puffs every 6 (six) hours as needed for wheezing   dicyclomine (BENTYL) 20 mg tablet   No No   Sig: Take 1 tablet (20 mg total) by mouth 2 (two) times a day   fluticasone (FLONASE) 50 mcg/act nasal spray   No No   Si spray into each nostril daily   ondansetron (ZOFRAN-ODT) 4 mg disintegrating tablet   No No   Sig: Take 1 tablet (4 mg total) by mouth every 6 (six) hours as needed for nausea   pantoprazole (PROTONIX) 40 mg tablet   Yes No   Sig: Take 40 mg by mouth daily      Facility-Administered Medications: None       Past Medical History:   Diagnosis Date    Asthma     Gastritis     Migraine        Past Surgical History:   Procedure Laterality Date    WRIST SURGERY         Family History   Family history unknown: Yes     I have reviewed and agree with the history as documented  E-Cigarette/Vaping     E-Cigarette/Vaping Substances     Social History     Tobacco Use    Smoking status: Current Some Day Smoker    Smokeless tobacco: Never Used   Substance Use Topics    Alcohol use: Yes    Drug use: No       Review of Systems   Constitutional: Negative for appetite change, chills, fatigue and fever  HENT: Positive for mouth sores   Negative for dental problem, drooling, facial swelling, sore throat, trouble swallowing and voice change  Gastrointestinal: Negative for nausea and vomiting  Musculoskeletal: Negative for neck pain and neck stiffness  Skin: Negative for color change and rash  Neurological: Negative for dizziness, weakness, light-headedness and headaches  Hematological: Negative for adenopathy  All other systems reviewed and are negative  Physical Exam  Physical Exam  Vitals signs reviewed  Constitutional:       General: He is awake  He is not in acute distress  Appearance: Normal appearance  He is well-developed  He is not ill-appearing or toxic-appearing  HENT:      Head: Normocephalic and atraumatic  Mouth/Throat:      Lips: Pink  No lesions  Mouth: Mucous membranes are moist  No injury, lacerations, oral lesions or angioedema  Dentition: Normal dentition  No gum lesions  Tongue: No lesions  Tongue does not deviate from midline  Palate: Lesions present  No mass  Pharynx: Oropharynx is clear  Uvula midline  Tonsils: No tonsillar exudate or tonsillar abscesses  0 on the right  0 on the left  Comments: Airway grossly patent; pea sized focal area of well demarcated mucosal swelling consistent with sublingual ranula on right bottom hard palate, no bleeding or discharge, no pus on floor or mouth or surrounding cellulitic changes  Eyes:      Extraocular Movements: Extraocular movements intact  Conjunctiva/sclera: Conjunctivae normal       Pupils: Pupils are equal, round, and reactive to light  Neck:      Musculoskeletal: Full passive range of motion without pain, normal range of motion and neck supple  Cardiovascular:      Rate and Rhythm: Normal rate and regular rhythm  Pulses: Normal pulses  Pulmonary:      Effort: Pulmonary effort is normal       Breath sounds: Normal breath sounds and air entry  Musculoskeletal: Normal range of motion  Skin:     General: Skin is warm        Capillary Refill: Capillary refill takes less than 2 seconds  Findings: No erythema, lesion or rash  Neurological:      Mental Status: He is alert and oriented to person, place, and time  Psychiatric:         Behavior: Behavior is cooperative  Vital Signs  ED Triage Vitals [05/20/21 2042]   Temperature Pulse Respirations Blood Pressure SpO2   98 2 °F (36 8 °C) 70 16 141/81 100 %      Temp Source Heart Rate Source Patient Position - Orthostatic VS BP Location FiO2 (%)   Oral Monitor -- -- --      Pain Score       6           Vitals:    05/20/21 2042   BP: 141/81   Pulse: 70         Visual Acuity      ED Medications  Medications - No data to display    Diagnostic Studies  Results Reviewed     None                 No orders to display              Procedures  Procedures         ED Course                             SBIRT 20yo+      Most Recent Value   SBIRT (24 yo +)   In order to provide better care to our patients, we are screening all of our patients for alcohol and drug use  Would it be okay to ask you these screening questions? No Filed at: 05/20/2021 2115                    MDM  Number of Diagnoses or Management Options  Oral lesion:   Oral pain:   Salivary mucocele or ranula:   Diagnosis management comments: Exam consistent with sublingual ranula  Discussed motrin for discomfort, warm salt water gargles, sour lozenges to promote salivation, and f/u with OMFS for further evaluation  Amount and/or Complexity of Data Reviewed  Decide to obtain previous medical records or to obtain history from someone other than the patient: yes  Obtain history from someone other than the patient: yes  Review and summarize past medical records: yes  Discuss the patient with other providers: yes    Patient Progress  Patient progress: stable (I discussed emergency department return parameters  I answered any and all questions the patient had regarding emergency department course of evaluation and treatment   The patient verbalized understanding of and agreement with plan   )      Disposition  Final diagnoses:   Oral lesion   Oral pain   Salivary mucocele or ranula     Time reflects when diagnosis was documented in both MDM as applicable and the Disposition within this note     Time User Action Codes Description Comment    5/20/2021  9:16 PM Lillian Bucy Add [K13 70] Oral lesion     5/20/2021  9:16 PM Lillian Bucy Add [K13 79] Oral pain     5/20/2021  9:16 PM Lillian Bucy Add [K11 6] Salivary mucocele or ranula       ED Disposition     ED Disposition Condition Date/Time Comment    Discharge Stable Thu May 20, 2021  9:11 PM Renard Vasquez discharge to home/self care              Follow-up Information     Follow up With Specialties Details Why [address] [facility] Emergency Department Emergency Medicine Go to  If symptoms worsen [facility] [address]  [address] [facility] Emergency Department, [address] [facility] , [facility], [facility], [place] [address] for Oral and Maxillofacial Surgery [facility]  Call in 1 day For further evaluation [address] [facility]  [place]           Discharge Medication List as of 5/20/2021  9:19 PM      CONTINUE these medications which have NOT CHANGED    Details   albuterol (PROVENTIL HFA,VENTOLIN HFA) 90 mcg/act inhaler Inhale 2 puffs every 6 (six) hours as needed for wheezing, Historical Med      dicyclomine (BENTYL) 20 mg tablet Take 1 tablet (20 mg total) by mouth 2 (two) times a day, Starting Mon 1/6/2020, Normal      fluticasone (FLONASE) 50 mcg/act nasal spray 1 spray into each nostril daily, Starting Thu 5/16/2019, Print      ondansetron (ZOFRAN-ODT) 4 mg disintegrating tablet Take 1 tablet (4 mg total) by mouth every 6 (six) hours as needed for nausea, Starting Mon 1/6/2020, Normal      pantoprazole (PROTONIX) 40 mg tablet Take 40 mg by mouth daily, Historical Med           No discharge procedures on file      PDMP Review     None          ED Provider  Electronically Signed by           Kathrine Buckner PA-C  05/20/21 2325

## 2021-06-09 ENCOUNTER — HOSPITAL ENCOUNTER (EMERGENCY)
Facility: HOSPITAL | Age: 31
Discharge: HOME | End: 2021-06-09
Attending: EMERGENCY MEDICINE | Admitting: EMERGENCY MEDICINE

## 2021-06-09 ENCOUNTER — APPOINTMENT (EMERGENCY)
Dept: CT IMAGING | Facility: HOSPITAL | Age: 31
End: 2021-06-09

## 2021-06-09 VITALS
SYSTOLIC BLOOD PRESSURE: 141 MMHG | RESPIRATION RATE: 19 BRPM | HEART RATE: 60 BPM | DIASTOLIC BLOOD PRESSURE: 94 MMHG | OXYGEN SATURATION: 100 % | TEMPERATURE: 97.5 F

## 2021-06-09 DIAGNOSIS — R19.7 DIARRHEA: ICD-10-CM

## 2021-06-09 DIAGNOSIS — R10.9 ABDOMINAL PAIN: Primary | ICD-10-CM

## 2021-06-09 DIAGNOSIS — R11.2 NAUSEA AND VOMITING: ICD-10-CM

## 2021-06-09 LAB
ALBUMIN SERPL BCP-MCNC: 4.4 G/DL (ref 3.5–5)
ALP SERPL-CCNC: 69 U/L (ref 46–116)
ALT SERPL W P-5'-P-CCNC: 70 U/L (ref 12–78)
ANION GAP SERPL CALCULATED.3IONS-SCNC: 13 MMOL/L (ref 4–13)
AST SERPL W P-5'-P-CCNC: 71 U/L (ref 5–45)
BASOPHILS # BLD AUTO: 0.09 THOUSANDS/ΜL (ref 0–0.1)
BASOPHILS NFR BLD AUTO: 1 % (ref 0–1)
BILIRUB SERPL-MCNC: 0.56 MG/DL (ref 0.2–1)
BUN SERPL-MCNC: 20 MG/DL (ref 5–25)
CALCIUM SERPL-MCNC: 9.7 MG/DL (ref 8.3–10.1)
CHLORIDE SERPL-SCNC: 102 MMOL/L (ref 100–108)
CO2 SERPL-SCNC: 26 MMOL/L (ref 21–32)
CREAT SERPL-MCNC: 0.78 MG/DL (ref 0.6–1.3)
EOSINOPHIL # BLD AUTO: 0.66 THOUSAND/ΜL (ref 0–0.61)
EOSINOPHIL NFR BLD AUTO: 9 % (ref 0–6)
ERYTHROCYTE [DISTWIDTH] IN BLOOD BY AUTOMATED COUNT: 12.4 % (ref 11.6–15.1)
GFR SERPL CREATININE-BSD FRML MDRD: 138 ML/MIN/1.73SQ M
GLUCOSE SERPL-MCNC: 106 MG/DL (ref 65–140)
HCT VFR BLD AUTO: 48.4 % (ref 36.5–49.3)
HGB BLD-MCNC: 16.5 G/DL (ref 12–17)
IMM GRANULOCYTES # BLD AUTO: 0.02 THOUSAND/UL (ref 0–0.2)
IMM GRANULOCYTES NFR BLD AUTO: 0 % (ref 0–2)
LIPASE SERPL-CCNC: 83 U/L (ref 73–393)
LYMPHOCYTES # BLD AUTO: 2.45 THOUSANDS/ΜL (ref 0.6–4.47)
LYMPHOCYTES NFR BLD AUTO: 32 % (ref 14–44)
MCH RBC QN AUTO: 31.2 PG (ref 26.8–34.3)
MCHC RBC AUTO-ENTMCNC: 34.1 G/DL (ref 31.4–37.4)
MCV RBC AUTO: 92 FL (ref 82–98)
MONOCYTES # BLD AUTO: 0.51 THOUSAND/ΜL (ref 0.17–1.22)
MONOCYTES NFR BLD AUTO: 7 % (ref 4–12)
NEUTROPHILS # BLD AUTO: 3.94 THOUSANDS/ΜL (ref 1.85–7.62)
NEUTS SEG NFR BLD AUTO: 51 % (ref 43–75)
NRBC BLD AUTO-RTO: 0 /100 WBCS
PLATELET # BLD AUTO: 269 THOUSANDS/UL (ref 149–390)
PMV BLD AUTO: 10.5 FL (ref 8.9–12.7)
POTASSIUM SERPL-SCNC: 3.7 MMOL/L (ref 3.5–5.3)
PROT SERPL-MCNC: 8.3 G/DL (ref 6.4–8.2)
RBC # BLD AUTO: 5.29 MILLION/UL (ref 3.88–5.62)
SODIUM SERPL-SCNC: 141 MMOL/L (ref 136–145)
WBC # BLD AUTO: 7.67 THOUSAND/UL (ref 4.31–10.16)

## 2021-06-09 PROCEDURE — 99284 EMERGENCY DEPT VISIT MOD MDM: CPT | Performed by: EMERGENCY MEDICINE

## 2021-06-09 PROCEDURE — 83690 ASSAY OF LIPASE: CPT | Performed by: EMERGENCY MEDICINE

## 2021-06-09 PROCEDURE — G1004 CDSM NDSC: HCPCS

## 2021-06-09 PROCEDURE — 99284 EMERGENCY DEPT VISIT MOD MDM: CPT

## 2021-06-09 PROCEDURE — 80053 COMPREHEN METABOLIC PANEL: CPT | Performed by: EMERGENCY MEDICINE

## 2021-06-09 PROCEDURE — 96375 TX/PRO/DX INJ NEW DRUG ADDON: CPT

## 2021-06-09 PROCEDURE — 74177 CT ABD & PELVIS W/CONTRAST: CPT

## 2021-06-09 PROCEDURE — 96374 THER/PROPH/DIAG INJ IV PUSH: CPT

## 2021-06-09 PROCEDURE — 36415 COLL VENOUS BLD VENIPUNCTURE: CPT | Performed by: EMERGENCY MEDICINE

## 2021-06-09 PROCEDURE — 85025 COMPLETE CBC W/AUTO DIFF WBC: CPT | Performed by: EMERGENCY MEDICINE

## 2021-06-09 PROCEDURE — 96361 HYDRATE IV INFUSION ADD-ON: CPT

## 2021-06-09 RX ORDER — ONDANSETRON 4 MG/1
4 TABLET, ORALLY DISINTEGRATING ORAL EVERY 6 HOURS PRN
Qty: 20 TABLET | Refills: 0 | Status: SHIPPED | OUTPATIENT
Start: 2021-06-09 | End: 2021-07-18

## 2021-06-09 RX ORDER — DICYCLOMINE HCL 20 MG
20 TABLET ORAL 2 TIMES DAILY
Qty: 20 TABLET | Refills: 0 | Status: SHIPPED | OUTPATIENT
Start: 2021-06-09 | End: 2021-07-18

## 2021-06-09 RX ORDER — KETOROLAC TROMETHAMINE 30 MG/ML
15 INJECTION, SOLUTION INTRAMUSCULAR; INTRAVENOUS ONCE
Status: COMPLETED | OUTPATIENT
Start: 2021-06-09 | End: 2021-06-09

## 2021-06-09 RX ORDER — METOCLOPRAMIDE HYDROCHLORIDE 5 MG/ML
10 INJECTION INTRAMUSCULAR; INTRAVENOUS ONCE
Status: COMPLETED | OUTPATIENT
Start: 2021-06-09 | End: 2021-06-09

## 2021-06-09 RX ADMIN — IOHEXOL 100 ML: 350 INJECTION, SOLUTION INTRAVENOUS at 15:18

## 2021-06-09 RX ADMIN — METOCLOPRAMIDE 10 MG: 5 INJECTION, SOLUTION INTRAMUSCULAR; INTRAVENOUS at 14:27

## 2021-06-09 RX ADMIN — KETOROLAC TROMETHAMINE 15 MG: 30 INJECTION, SOLUTION INTRAMUSCULAR; INTRAVENOUS at 14:27

## 2021-06-09 RX ADMIN — SODIUM CHLORIDE 1000 ML: 0.9 INJECTION, SOLUTION INTRAVENOUS at 14:27

## 2021-06-09 NOTE — ED PROVIDER NOTES
History  Chief Complaint   Patient presents with    Abdominal Pain     Pt reports RLQ abdominal pain that began 15 minutes  Pt is moaning in triage  Pt reports nausea, denies doing anything when pain began       27 y o  M p/w RLQ pain x 1 week  Intermittent, sharp  Associated with N/V/D  Denies F/C, urinary complaints, h/o abd surgeries, h/o kidney stones  History provided by:  Patient   used: No    Abdominal Pain  Pain location:  RLQ  Pain quality: sharp    Duration:  1 week  Timing:  Intermittent  Progression:  Unchanged  Chronicity:  New  Context: not previous surgeries    Relieved by:  None tried  Worsened by:  Nothing  Ineffective treatments:  None tried  Associated symptoms: diarrhea, nausea and vomiting    Associated symptoms: no dysuria and no fever        Prior to Admission Medications   Prescriptions Last Dose Informant Patient Reported? Taking? albuterol (PROVENTIL HFA,VENTOLIN HFA) 90 mcg/act inhaler   Yes No   Sig: Inhale 2 puffs every 6 (six) hours as needed for wheezing   dicyclomine (BENTYL) 20 mg tablet   No No   Sig: Take 1 tablet (20 mg total) by mouth 2 (two) times a day   fluticasone (FLONASE) 50 mcg/act nasal spray   No No   Si spray into each nostril daily   ondansetron (ZOFRAN-ODT) 4 mg disintegrating tablet   No No   Sig: Take 1 tablet (4 mg total) by mouth every 6 (six) hours as needed for nausea   pantoprazole (PROTONIX) 40 mg tablet   Yes No   Sig: Take 40 mg by mouth daily      Facility-Administered Medications: None       Past Medical History:   Diagnosis Date    Asthma     Gastritis     Migraine        Past Surgical History:   Procedure Laterality Date    WRIST SURGERY         Family History   Family history unknown: Yes     I have reviewed and agree with the history as documented      E-Cigarette/Vaping    E-Cigarette Use Never User      E-Cigarette/Vaping Substances     Social History     Tobacco Use    Smoking status: Current Some Day Smoker  Smokeless tobacco: Never Used   Substance Use Topics    Alcohol use: Yes    Drug use: No       Review of Systems   Constitutional: Positive for diaphoresis  Negative for fever  Gastrointestinal: Positive for abdominal pain, diarrhea, nausea and vomiting  Genitourinary: Negative for dysuria and frequency  All other systems reviewed and are negative  Physical Exam  Physical Exam  Vitals signs and nursing note reviewed  Constitutional:       General: He is not in acute distress  Appearance: Normal appearance  He is well-developed  He is diaphoretic  He is not ill-appearing or toxic-appearing  HENT:      Head: Normocephalic and atraumatic  Eyes:      General: No scleral icterus  Neck:      Musculoskeletal: Normal range of motion  Vascular: No JVD  Trachea: Trachea normal    Cardiovascular:      Rate and Rhythm: Normal rate and regular rhythm  Heart sounds: Normal heart sounds  No murmur  No friction rub  Pulmonary:      Effort: Pulmonary effort is normal  No accessory muscle usage or respiratory distress  Breath sounds: Normal breath sounds  No stridor  No wheezing, rhonchi or rales  Abdominal:      General: There is no distension  Palpations: Abdomen is soft  Abdomen is not rigid  There is no mass  Tenderness: There is abdominal tenderness in the right lower quadrant  There is no guarding or rebound  Skin:     General: Skin is warm  Coloration: Skin is not pale  Findings: No rash  Neurological:      Mental Status: He is alert  GCS: GCS eye subscore is 4  GCS verbal subscore is 5  GCS motor subscore is 6     Psychiatric:         Behavior: Behavior normal          Vital Signs  ED Triage Vitals [06/09/21 1403]   Temperature Pulse Respirations Blood Pressure SpO2   97 5 °F (36 4 °C) 60 19 141/94 100 %      Temp Source Heart Rate Source Patient Position - Orthostatic VS BP Location FiO2 (%)   Oral Monitor Sitting Right arm --      Pain Score Worst Possible Pain           Vitals:    06/09/21 1403   BP: 141/94   Pulse: 60   Patient Position - Orthostatic VS: Sitting         Visual Acuity      ED Medications  Medications   sodium chloride 0 9 % bolus 1,000 mL (0 mL Intravenous Stopped 6/9/21 1632)   ketorolac (TORADOL) injection 15 mg (15 mg Intravenous Given 6/9/21 1427)   metoclopramide (REGLAN) injection 10 mg (10 mg Intravenous Given 6/9/21 1427)   iohexol (OMNIPAQUE) 350 MG/ML injection (SINGLE-DOSE) 100 mL (100 mL Intravenous Given 6/9/21 1518)       Diagnostic Studies  Results Reviewed     Procedure Component Value Units Date/Time    Comprehensive metabolic panel [[id number]]  (Abnormal) Collected: 06/09/21 1426    Lab Status: Final result Specimen: Blood from Arm, Right Updated: 06/09/21 1448     Sodium 141 mmol/L      Potassium 3 7 mmol/L      Chloride 102 mmol/L      CO2 26 mmol/L      ANION GAP 13 mmol/L      BUN 20 mg/dL      Creatinine 0 78 mg/dL      Glucose 106 mg/dL      Calcium 9 7 mg/dL      AST 71 U/L      ALT 70 U/L      Alkaline Phosphatase 69 U/L      Total Protein 8 3 g/dL      Albumin 4 4 g/dL      Total Bilirubin 0 56 mg/dL      eGFR 138 ml/min/1 73sq m     Narrative:      [place] guidelines for Chronic Kidney Disease (CKD):     Stage 1 with normal or high GFR (GFR > 90 mL/min/1 73 square meters)    Stage 2 Mild CKD (GFR = 60-89 mL/min/1 73 square meters)    Stage 3A Moderate CKD (GFR = 45-59 mL/min/1 73 square meters)    Stage 3B Moderate CKD (GFR = 30-44 mL/min/1 73 square meters)    Stage 4 Severe CKD (GFR = 15-29 mL/min/1 73 square meters)    Stage 5 End Stage CKD (GFR <15 mL/min/1 73 square meters)  Note: GFR calculation is accurate only with a steady state creatinine    Lipase [[id number]]  (Normal) Collected: 06/09/21 1426    Lab Status: Final result Specimen: Blood from Arm, Right Updated: 06/09/21 1448     Lipase 83 u/L     CBC and differential [[id number]]  (Abnormal) Collected: 06/09/21 1426    Lab Status: Final result Specimen: Blood from Arm, Right Updated: 06/09/21 1434     WBC 7 67 Thousand/uL      RBC 5 29 Million/uL      Hemoglobin 16 5 g/dL      Hematocrit 48 4 %      MCV 92 fL      MCH 31 2 pg      MCHC 34 1 g/dL      RDW 12 4 %      MPV 10 5 fL      Platelets 818 Thousands/uL      nRBC 0 /100 WBCs      Neutrophils Relative 51 %      Immat GRANS % 0 %      Lymphocytes Relative 32 %      Monocytes Relative 7 %      Eosinophils Relative 9 %      Basophils Relative 1 %      Neutrophils Absolute 3 94 Thousands/µL      Immature Grans Absolute 0 02 Thousand/uL      Lymphocytes Absolute 2 45 Thousands/µL      Monocytes Absolute 0 51 Thousand/µL      Eosinophils Absolute 0 66 Thousand/µL      Basophils Absolute 0 09 Thousands/µL                  CT abdomen pelvis with contrast   Final Result by Loretta Molina MD (06/09 1606)      Appendix is unremarkable  No renal calculus, hydronephrosis  No acute intra-abdominal finding  Workstation performed: [id number]                    Procedures  Procedures         ED Course  ED Course as of Jun 09 1638   Wed Jun 09, 2021   1427 Pt given Toradol/Reglan  1511 Pt reports feeling better  1619 Updated pt on results  Pt reports frustration that he's been dealing with these symptoms "for years" the GI doctor can't figure out why  Pt reports having scopes in the past for this issue  He states he's been told he has a slow transit time in his GI tract, which may be the cause of his pain  I instructed pt to f/u with his GI doctor                                                MDM    Disposition  Final diagnoses:   Abdominal pain   Nausea and vomiting   Diarrhea     Time reflects when diagnosis was documented in both MDM as applicable and the Disposition within this note     Time User Action Codes Description Comment    6/9/2021  4:10 PM Shane Bergeron 47 [R10 9] Abdominal pain     6/9/2021  4:10 PM Shane Bergeron 47 [R11 2] Nausea and vomiting     6/9/2021  4:10 PM MARCELINO Bergeron Home	[place] Add [R19 7] Diarrhea       ED Disposition     ED Disposition Condition Date/Time Comment    Discharge Stable Wed Jun 9, 2021  4:11 PM Florence Stacy discharge to home/self care  Follow-up Information    None         Discharge Medication List as of 6/9/2021  4:11 PM      START taking these medications    Details   !! dicyclomine (BENTYL) 20 mg tablet Take 1 tablet (20 mg total) by mouth 2 (two) times a day, Starting Wed 6/9/2021, Print      !! ondansetron (ZOFRAN-ODT) 4 mg disintegrating tablet Take 1 tablet (4 mg total) by mouth every 6 (six) hours as needed for nausea or vomiting, Starting Wed 6/9/2021, Print       !! - Potential duplicate medications found  Please discuss with provider  CONTINUE these medications which have NOT CHANGED    Details   albuterol (PROVENTIL HFA,VENTOLIN HFA) 90 mcg/act inhaler Inhale 2 puffs every 6 (six) hours as needed for wheezing, Historical Med      !! dicyclomine (BENTYL) 20 mg tablet Take 1 tablet (20 mg total) by mouth 2 (two) times a day, Starting Mon 1/6/2020, Normal      fluticasone (FLONASE) 50 mcg/act nasal spray 1 spray into each nostril daily, Starting Thu 5/16/2019, Print      !! ondansetron (ZOFRAN-ODT) 4 mg disintegrating tablet Take 1 tablet (4 mg total) by mouth every 6 (six) hours as needed for nausea, Starting Mon 1/6/2020, Normal      pantoprazole (PROTONIX) 40 mg tablet Take 40 mg by mouth daily, Historical Med       !! - Potential duplicate medications found  Please discuss with provider  No discharge procedures on file      PDMP Review     None          ED Provider  Electronically Signed by           Johana Ritter 23, DO  06/09/21 2469

## 2021-07-18 ENCOUNTER — HOSPITAL ENCOUNTER (EMERGENCY)
Facility: HOSPITAL | Age: 31
Discharge: HOME | End: 2021-07-18
Attending: EMERGENCY MEDICINE | Admitting: EMERGENCY MEDICINE

## 2021-07-18 VITALS
TEMPERATURE: 98 F | BODY MASS INDEX: 29.73 KG/M2 | RESPIRATION RATE: 14 BRPM | DIASTOLIC BLOOD PRESSURE: 66 MMHG | OXYGEN SATURATION: 99 % | HEART RATE: 65 BPM | WEIGHT: 207.23 LBS | SYSTOLIC BLOOD PRESSURE: 117 MMHG

## 2021-07-18 DIAGNOSIS — K31.84 GASTROPARESIS: ICD-10-CM

## 2021-07-18 DIAGNOSIS — R55 SYNCOPE: Primary | ICD-10-CM

## 2021-07-18 LAB
ALBUMIN SERPL BCP-MCNC: 3.6 G/DL (ref 3.5–5)
ALP SERPL-CCNC: 58 U/L (ref 46–116)
ALT SERPL W P-5'-P-CCNC: 25 U/L (ref 12–78)
ANION GAP SERPL CALCULATED.3IONS-SCNC: 6 MMOL/L (ref 4–13)
AST SERPL W P-5'-P-CCNC: 16 U/L (ref 5–45)
ATRIAL RATE: 70 BPM
BASOPHILS # BLD AUTO: 0.09 THOUSANDS/ΜL (ref 0–0.1)
BASOPHILS NFR BLD AUTO: 1 % (ref 0–1)
BILIRUB SERPL-MCNC: 0.26 MG/DL (ref 0.2–1)
BUN SERPL-MCNC: 22 MG/DL (ref 5–25)
CALCIUM SERPL-MCNC: 8.8 MG/DL (ref 8.3–10.1)
CHLORIDE SERPL-SCNC: 105 MMOL/L (ref 100–108)
CO2 SERPL-SCNC: 29 MMOL/L (ref 21–32)
CREAT SERPL-MCNC: 0.85 MG/DL (ref 0.6–1.3)
EOSINOPHIL # BLD AUTO: 0.51 THOUSAND/ΜL (ref 0–0.61)
EOSINOPHIL NFR BLD AUTO: 6 % (ref 0–6)
ERYTHROCYTE [DISTWIDTH] IN BLOOD BY AUTOMATED COUNT: 11.7 % (ref 11.6–15.1)
GFR SERPL CREATININE-BSD FRML MDRD: 133 ML/MIN/1.73SQ M
GLUCOSE SERPL-MCNC: 113 MG/DL (ref 65–140)
GLUCOSE SERPL-MCNC: 134 MG/DL (ref 65–140)
HCT VFR BLD AUTO: 43.4 % (ref 36.5–49.3)
HGB BLD-MCNC: 14.8 G/DL (ref 12–17)
IMM GRANULOCYTES # BLD AUTO: 0.04 THOUSAND/UL (ref 0–0.2)
IMM GRANULOCYTES NFR BLD AUTO: 1 % (ref 0–2)
LYMPHOCYTES # BLD AUTO: 3.01 THOUSANDS/ΜL (ref 0.6–4.47)
LYMPHOCYTES NFR BLD AUTO: 36 % (ref 14–44)
MCH RBC QN AUTO: 31.4 PG (ref 26.8–34.3)
MCHC RBC AUTO-ENTMCNC: 34.1 G/DL (ref 31.4–37.4)
MCV RBC AUTO: 92 FL (ref 82–98)
MONOCYTES # BLD AUTO: 0.49 THOUSAND/ΜL (ref 0.17–1.22)
MONOCYTES NFR BLD AUTO: 6 % (ref 4–12)
NEUTROPHILS # BLD AUTO: 4.25 THOUSANDS/ΜL (ref 1.85–7.62)
NEUTS SEG NFR BLD AUTO: 50 % (ref 43–75)
NRBC BLD AUTO-RTO: 0 /100 WBCS
P AXIS: 24 DEGREES
PLATELET # BLD AUTO: 250 THOUSANDS/UL (ref 149–390)
PMV BLD AUTO: 11.2 FL (ref 8.9–12.7)
POTASSIUM SERPL-SCNC: 4.2 MMOL/L (ref 3.5–5.3)
PR INTERVAL: 148 MS
PROT SERPL-MCNC: 7.3 G/DL (ref 6.4–8.2)
QRS AXIS: 39 DEGREES
QRSD INTERVAL: 98 MS
QT INTERVAL: 394 MS
QTC INTERVAL: 425 MS
RBC # BLD AUTO: 4.72 MILLION/UL (ref 3.88–5.62)
SODIUM SERPL-SCNC: 140 MMOL/L (ref 136–145)
T WAVE AXIS: -6 DEGREES
VENTRICULAR RATE: 70 BPM
WBC # BLD AUTO: 8.39 THOUSAND/UL (ref 4.31–10.16)

## 2021-07-18 PROCEDURE — 96375 TX/PRO/DX INJ NEW DRUG ADDON: CPT

## 2021-07-18 PROCEDURE — 96361 HYDRATE IV INFUSION ADD-ON: CPT

## 2021-07-18 PROCEDURE — 93010 ELECTROCARDIOGRAM REPORT: CPT | Performed by: INTERNAL MEDICINE

## 2021-07-18 PROCEDURE — 93005 ELECTROCARDIOGRAM TRACING: CPT

## 2021-07-18 PROCEDURE — 80053 COMPREHEN METABOLIC PANEL: CPT | Performed by: EMERGENCY MEDICINE

## 2021-07-18 PROCEDURE — 36415 COLL VENOUS BLD VENIPUNCTURE: CPT | Performed by: EMERGENCY MEDICINE

## 2021-07-18 PROCEDURE — 99285 EMERGENCY DEPT VISIT HI MDM: CPT

## 2021-07-18 PROCEDURE — 99284 EMERGENCY DEPT VISIT MOD MDM: CPT | Performed by: EMERGENCY MEDICINE

## 2021-07-18 PROCEDURE — 96374 THER/PROPH/DIAG INJ IV PUSH: CPT

## 2021-07-18 PROCEDURE — 85025 COMPLETE CBC W/AUTO DIFF WBC: CPT | Performed by: EMERGENCY MEDICINE

## 2021-07-18 PROCEDURE — 82948 REAGENT STRIP/BLOOD GLUCOSE: CPT

## 2021-07-18 RX ORDER — METOCLOPRAMIDE HYDROCHLORIDE 5 MG/ML
10 INJECTION INTRAMUSCULAR; INTRAVENOUS ONCE
Status: COMPLETED | OUTPATIENT
Start: 2021-07-18 | End: 2021-07-18

## 2021-07-18 RX ORDER — METOCLOPRAMIDE 10 MG/1
10 TABLET ORAL EVERY 6 HOURS
Qty: 30 TABLET | Refills: 0 | Status: SHIPPED | OUTPATIENT
Start: 2021-07-18

## 2021-07-18 RX ORDER — DIPHENHYDRAMINE HYDROCHLORIDE 50 MG/ML
25 INJECTION INTRAMUSCULAR; INTRAVENOUS ONCE
Status: COMPLETED | OUTPATIENT
Start: 2021-07-18 | End: 2021-07-18

## 2021-07-18 RX ADMIN — METOCLOPRAMIDE 10 MG: 5 INJECTION, SOLUTION INTRAMUSCULAR; INTRAVENOUS at 12:17

## 2021-07-18 RX ADMIN — DIPHENHYDRAMINE HYDROCHLORIDE 25 MG: 50 INJECTION, SOLUTION INTRAMUSCULAR; INTRAVENOUS at 12:16

## 2021-07-18 RX ADMIN — SODIUM CHLORIDE 1000 ML: 0.9 INJECTION, SOLUTION INTRAVENOUS at 12:16

## 2021-07-18 NOTE — ED NOTES
Pt reports feeling improved after medication, lying quietly in bed appears comfortable denies complaints at this time        Kiran Wright RN  07/18/21 3395

## 2021-07-18 NOTE — ED PROVIDER NOTES
Pt Name: Saintclair Mulling  MRN: [id number]  [place] 1990  Age/Sex: 27 y o  male  Date of evaluation: 7/18/2021  PCP: Sheela Bagley MD    [address] [place] [address]    Chief Complaint   Patient presents with    Syncope     reports drank milk with collagen this am after waking, began to feel unwell "cold" then syncope "i don't remember anything" wife called EMS, EMS states pt pale and diaphoretic on arrival with hypotension  pt reports hx GI issues and recent weight loss  HPI    Samantha Khan presents to the Emergency Department complaining of syncope  He has had several episodes of syncope this year  They have been associated with eating, followed by sweating and sharp abdominal pain  He has had full work up for this abdominal pain  He has seen Gi in the last and it was suggested that he may have gastroparesis  He has stopped drinking ETOH, he eats a diet of mostly fruit and veggies  He takes protonix for heart burn at times but these things are not helping  He has had significant weight loss and now it it affecting his social life as he will not go out to eat socially because he knows that he will not feel well  HPI      Past Medical and Surgical History    Past Medical History:   Diagnosis Date    Asthma     Gastritis     Migraine        Past Surgical History:   Procedure Laterality Date    WRIST SURGERY         Family History   Family history unknown: Yes       Social History     Tobacco Use    Smoking status: Current Some Day Smoker    Smokeless tobacco: Never Used   Vaping Use    Vaping Use: Never used   Substance Use Topics    Alcohol use: Yes    Drug use: No              Allergies    No Known Allergies    Home Medications    Prior to Admission medications    Medication Sig Start Date End Date Taking?  Authorizing Provider   albuterol (PROVENTIL HFA,VENTOLIN HFA) 90 mcg/act inhaler Inhale 2 puffs every 6 (six) hours as needed for wheezing   Yes Historical Provider, MD   fluticasone (FLONASE) 50 mcg/act nasal spray 1 spray into each nostril daily 5/16/19  Yes Nathaniel Webster MD   pantoprazole (PROTONIX) 40 mg tablet Take 40 mg by mouth daily   Yes Alfredo Davidson MD   dicyclomine (BENTYL) 20 mg tablet Take 1 tablet (20 mg total) by mouth 2 (two) times a day 1/6/20 7/18/21  Mikala Morgan MD   dicyclomine (BENTYL) 20 mg tablet Take 1 tablet (20 mg total) by mouth 2 (two) times a day 6/9/21 7/18/21  María Bergeron DO   ondansetron (ZOFRAN-ODT) 4 mg disintegrating tablet Take 1 tablet (4 mg total) by mouth every 6 (six) hours as needed for nausea 1/6/20 7/18/21  Mikala Morgan MD   ondansetron (ZOFRAN-ODT) 4 mg disintegrating tablet Take 1 tablet (4 mg total) by mouth every 6 (six) hours as needed for nausea or vomiting 6/9/21 7/18/21  María Bergeron DO           Review of Systems    Review of Systems   Constitutional: Negative for chills and fever  HENT: Negative for ear pain and sore throat  Eyes: Negative for pain and visual disturbance  Respiratory: Negative for cough and shortness of breath  Cardiovascular: Negative for chest pain and palpitations  Gastrointestinal: Positive for abdominal pain, nausea and vomiting  Genitourinary: Negative for dysuria and hematuria  Musculoskeletal: Negative for arthralgias and back pain  Skin: Negative for color change and rash  Neurological: Positive for syncope  Negative for seizures  All other systems reviewed and are negative  Physical Exam      ED Triage Vitals [07/18/21 1150]   Temperature Pulse Respirations Blood Pressure SpO2   98 °F (36 7 °C) 72 16 91/55 100 %      Temp Source Heart Rate Source Patient Position - Orthostatic VS BP Location FiO2 (%)   Oral -- Lying Right arm --      Pain Score       3               Physical Exam  Vitals and nursing note reviewed  Constitutional:       General: He is not in acute distress  Appearance: He is well-developed  He is not diaphoretic     HENT:      Head: Normocephalic and atraumatic  Nose: Nose normal    Eyes:      Conjunctiva/sclera: Conjunctivae normal       Pupils: Pupils are equal, round, and reactive to light  Cardiovascular:      Rate and Rhythm: Normal rate and regular rhythm  Heart sounds: Normal heart sounds  No murmur heard  No friction rub  No gallop  Pulmonary:      Effort: Pulmonary effort is normal  No respiratory distress  Breath sounds: Normal breath sounds  No wheezing or rales  Abdominal:      General: Bowel sounds are normal       Palpations: Abdomen is soft  Tenderness: There is no abdominal tenderness  There is no guarding or rebound  Musculoskeletal:         General: Normal range of motion  Cervical back: Normal range of motion and neck supple  Skin:     General: Skin is warm and dry  Neurological:      Mental Status: He is alert and oriented to person, place, and time  Psychiatric:         Behavior: Behavior normal                 Assessment and Plan    Reese Lopez is a 27 y o  male who presents with syncope  Physical examination unremarkable  Differential diagnosis (not completely inclusive) includes vasovagal associated with abdominal pain  Plan will be to perform diagnostic testing and treat symptomatically        MDM    Diagnostic Results      Labs:    Results for orders placed or performed during the hospital encounter of 07/18/21   CBC and differential   Result Value Ref Range    WBC 8 39 4 31 - 10 16 Thousand/uL    RBC 4 72 3 88 - 5 62 Million/uL    Hemoglobin 14 8 12 0 - 17 0 g/dL    Hematocrit 43 4 36 5 - 49 3 %    MCV 92 82 - 98 fL    MCH 31 4 26 8 - 34 3 pg    MCHC 34 1 31 4 - 37 4 g/dL    RDW 11 7 11 6 - 15 1 %    MPV 11 2 8 9 - 12 7 fL    Platelets 578 225 - 070 Thousands/uL    nRBC 0 /100 WBCs    Neutrophils Relative 50 43 - 75 %    Immat GRANS % 1 0 - 2 %    Lymphocytes Relative 36 14 - 44 %    Monocytes Relative 6 4 - 12 %    Eosinophils Relative 6 0 - 6 %    Basophils Relative 1 0 - 1 %    Neutrophils Absolute 4 25 1 85 - 7 62 Thousands/µL    Immature Grans Absolute 0 04 0 00 - 0 20 Thousand/uL    Lymphocytes Absolute 3 01 0 60 - 4 47 Thousands/µL    Monocytes Absolute 0 49 0 17 - 1 22 Thousand/µL    Eosinophils Absolute 0 51 0 00 - 0 61 Thousand/µL    Basophils Absolute 0 09 0 00 - 0 10 Thousands/µL   Comprehensive metabolic panel   Result Value Ref Range    Sodium 140 136 - 145 mmol/L    Potassium 4 2 3 5 - 5 3 mmol/L    Chloride 105 100 - 108 mmol/L    CO2 29 21 - 32 mmol/L    ANION GAP 6 4 - 13 mmol/L    BUN 22 5 - 25 mg/dL    Creatinine 0 85 0 60 - 1 30 mg/dL    Glucose 134 65 - 140 mg/dL    Calcium 8 8 8 3 - 10 1 mg/dL    AST 16 5 - 45 U/L    ALT 25 12 - 78 U/L    Alkaline Phosphatase 58 46 - 116 U/L    Total Protein 7 3 6 4 - 8 2 g/dL    Albumin 3 6 3 5 - 5 0 g/dL    Total Bilirubin 0 26 0 20 - 1 00 mg/dL    eGFR 133 ml/min/1 73sq m   Fingerstick Glucose (POCT)   Result Value Ref Range    POC Glucose 113 65 - 140 mg/dl       All labs reviewed and utilized in the medical decision making process    Radiology:    No orders to display       All radiology studies independently viewed by me and interpreted by the radiologist     Procedure    Procedures      ED Course of Care and Re-Assessments    Patient was feeling much better after reglan        Medications   sodium chloride 0 9 % bolus 1,000 mL (0 mL Intravenous Stopped 7/18/21 1328)   metoclopramide (REGLAN) injection 10 mg (10 mg Intravenous Given 7/18/21 1217)   diphenhydrAMINE (BENADRYL) injection 25 mg (25 mg Intravenous Given 7/18/21 1216)           FINAL IMPRESSION    Final diagnoses:   Syncope   Gastroparesis         DISPOSITION/PLAN    Time reflects when diagnosis was documented in both MDM as applicable and the Disposition within this note     Time User Action Codes Description Comment    7/18/2021  1:37 PM Altamese Montgomery L Add [R55] Syncope     7/18/2021  1:38 PM Altamese Montgomery L Add [K31 84] Gastroparesis ED Disposition     ED Disposition Condition Date/Time Comment    Discharge Stable Sun Jul 18, 2021  1:38 PM Cinthia Hodgkins discharge to home/self care  Follow-up Information     Follow up With Specialties Details Why Contact Info Additional Information    Tano Coon MD Family Medicine Schedule an appointment as soon as possible for a visit   [address]  [address]  [address] [facility] Schedule an appointment as soon as possible for a visit   [address]  [address] [facility] [address]  Alisha Joseph [address] [facility] [place], [address], [address], [place], [place], [id number] [phone]            PATIENT REFERRED TO:    Tano Coon MD  [address]  [phone]    Schedule an appointment as soon as possible for a visit       [facility] [place]  [address] [facility] [address] [facility] [address] [phone]  Schedule an appointment as soon as possible for a visit         DISCHARGE MEDICATIONS:    Discharge Medication List as of 7/18/2021  1:40 PM      START taking these medications    Details   metoclopramide (REGLAN) 10 mg tablet Take 1 tablet (10 mg total) by mouth every 6 (six) hours, Starting Sun 7/18/2021, Normal         CONTINUE these medications which have NOT CHANGED    Details   albuterol (PROVENTIL HFA,VENTOLIN HFA) 90 mcg/act inhaler Inhale 2 puffs every 6 (six) hours as needed for wheezing, Historical Med      fluticasone (FLONASE) 50 mcg/act nasal spray 1 spray into each nostril daily, Starting u 5/16/2019, Print      pantoprazole (PROTONIX) 40 mg tablet Take 40 mg by mouth daily, Historical Med             No discharge procedures on file           Marita Hodgkin, DO Marita Hodgkin, DO  07/18/21 7655

## 2022-11-11 ENCOUNTER — HOSPITAL ENCOUNTER (EMERGENCY)
Facility: HOSPITAL | Age: 32
Discharge: HOME | End: 2022-11-11
Attending: EMERGENCY MEDICINE

## 2022-11-11 ENCOUNTER — APPOINTMENT (EMERGENCY)
Dept: CT IMAGING | Facility: HOSPITAL | Age: 32
End: 2022-11-11

## 2022-11-11 VITALS
BODY MASS INDEX: 29.17 KG/M2 | TEMPERATURE: 98.8 F | WEIGHT: 203.26 LBS | HEART RATE: 83 BPM | DIASTOLIC BLOOD PRESSURE: 65 MMHG | SYSTOLIC BLOOD PRESSURE: 116 MMHG | OXYGEN SATURATION: 100 % | RESPIRATION RATE: 18 BRPM

## 2022-11-11 DIAGNOSIS — R10.31 RIGHT LOWER QUADRANT ABDOMINAL PAIN: Primary | ICD-10-CM

## 2022-11-11 DIAGNOSIS — N28.1 RENAL CYST, LEFT: ICD-10-CM

## 2022-11-11 DIAGNOSIS — R11.2 NAUSEA AND VOMITING: ICD-10-CM

## 2022-11-11 DIAGNOSIS — M85.60 CYST OF BONE: ICD-10-CM

## 2022-11-11 LAB
ALBUMIN SERPL BCP-MCNC: 4.4 G/DL (ref 3.5–5)
ALP SERPL-CCNC: 59 U/L (ref 46–116)
ALT SERPL W P-5'-P-CCNC: 27 U/L (ref 12–78)
ANION GAP SERPL CALCULATED.3IONS-SCNC: 11 MMOL/L (ref 4–13)
AST SERPL W P-5'-P-CCNC: 21 U/L (ref 5–45)
BACTERIA UR QL AUTO: ABNORMAL /HPF
BASOPHILS # BLD AUTO: 0.08 THOUSANDS/ÂΜL (ref 0–0.1)
BASOPHILS NFR BLD AUTO: 1 % (ref 0–1)
BILIRUB DIRECT SERPL-MCNC: 0.21 MG/DL (ref 0–0.2)
BILIRUB SERPL-MCNC: 1.03 MG/DL (ref 0.2–1)
BILIRUB UR QL STRIP: NEGATIVE
BUN SERPL-MCNC: 17 MG/DL (ref 5–25)
CALCIUM SERPL-MCNC: 9.6 MG/DL (ref 8.3–10.1)
CHLORIDE SERPL-SCNC: 101 MMOL/L (ref 96–108)
CLARITY UR: CLEAR
CO2 SERPL-SCNC: 28 MMOL/L (ref 21–32)
COLOR UR: YELLOW
CREAT SERPL-MCNC: 0.66 MG/DL (ref 0.6–1.3)
EOSINOPHIL # BLD AUTO: 0.08 THOUSAND/ÂΜL (ref 0–0.61)
EOSINOPHIL NFR BLD AUTO: 1 % (ref 0–6)
ERYTHROCYTE [DISTWIDTH] IN BLOOD BY AUTOMATED COUNT: 11.8 % (ref 11.6–15.1)
GFR SERPL CREATININE-BSD FRML MDRD: 127 ML/MIN/1.73SQ M
GLUCOSE SERPL-MCNC: 79 MG/DL (ref 65–140)
GLUCOSE UR STRIP-MCNC: NEGATIVE MG/DL
HCT VFR BLD AUTO: 44.2 % (ref 36.5–49.3)
HGB BLD-MCNC: 15.7 G/DL (ref 12–17)
HGB UR QL STRIP.AUTO: NEGATIVE
IMM GRANULOCYTES # BLD AUTO: 0.02 THOUSAND/UL (ref 0–0.2)
IMM GRANULOCYTES NFR BLD AUTO: 0 % (ref 0–2)
KETONES UR STRIP-MCNC: ABNORMAL MG/DL
LEUKOCYTE ESTERASE UR QL STRIP: NEGATIVE
LIPASE SERPL-CCNC: 87 U/L (ref 73–393)
LYMPHOCYTES # BLD AUTO: 1.46 THOUSANDS/ÂΜL (ref 0.6–4.47)
LYMPHOCYTES NFR BLD AUTO: 15 % (ref 14–44)
MCH RBC QN AUTO: 31.2 PG (ref 26.8–34.3)
MCHC RBC AUTO-ENTMCNC: 35.5 G/DL (ref 31.4–37.4)
MCV RBC AUTO: 88 FL (ref 82–98)
MONOCYTES # BLD AUTO: 0.58 THOUSAND/ÂΜL (ref 0.17–1.22)
MONOCYTES NFR BLD AUTO: 6 % (ref 4–12)
MUCOUS THREADS UR QL AUTO: ABNORMAL
NEUTROPHILS # BLD AUTO: 7.39 THOUSANDS/ÂΜL (ref 1.85–7.62)
NEUTS SEG NFR BLD AUTO: 77 % (ref 43–75)
NITRITE UR QL STRIP: NEGATIVE
NON-SQ EPI CELLS URNS QL MICRO: ABNORMAL /HPF
NRBC BLD AUTO-RTO: 0 /100 WBCS
PH UR STRIP.AUTO: 7.5 [PH] (ref 4.5–8)
PLATELET # BLD AUTO: 261 THOUSANDS/UL (ref 149–390)
PMV BLD AUTO: 10.2 FL (ref 8.9–12.7)
POTASSIUM SERPL-SCNC: 3.6 MMOL/L (ref 3.5–5.3)
PROT SERPL-MCNC: 8.3 G/DL (ref 6.4–8.4)
PROT UR STRIP-MCNC: ABNORMAL MG/DL
RBC # BLD AUTO: 5.04 MILLION/UL (ref 3.88–5.62)
RBC #/AREA URNS AUTO: ABNORMAL /HPF
SODIUM SERPL-SCNC: 140 MMOL/L (ref 135–147)
SP GR UR STRIP.AUTO: 1.02 (ref 1–1.03)
UROBILINOGEN UR QL STRIP.AUTO: 0.2 E.U./DL
WBC # BLD AUTO: 9.61 THOUSAND/UL (ref 4.31–10.16)
WBC #/AREA URNS AUTO: ABNORMAL /HPF

## 2022-11-11 RX ORDER — ONDANSETRON 2 MG/ML
4 INJECTION INTRAMUSCULAR; INTRAVENOUS ONCE
Status: COMPLETED | OUTPATIENT
Start: 2022-11-11 | End: 2022-11-11

## 2022-11-11 RX ORDER — DICYCLOMINE HCL 20 MG
20 TABLET ORAL ONCE
Status: COMPLETED | OUTPATIENT
Start: 2022-11-11 | End: 2022-11-11

## 2022-11-11 RX ORDER — MELATONIN
1000 DAILY
COMMUNITY

## 2022-11-11 RX ORDER — ONDANSETRON 4 MG/1
4 TABLET, ORALLY DISINTEGRATING ORAL EVERY 8 HOURS PRN
Qty: 6 TABLET | Refills: 0 | Status: SHIPPED | OUTPATIENT
Start: 2022-11-11 | End: 2022-11-13

## 2022-11-11 RX ORDER — DICYCLOMINE HCL 20 MG
20 TABLET ORAL 2 TIMES DAILY PRN
Qty: 4 TABLET | Refills: 0 | Status: SHIPPED | OUTPATIENT
Start: 2022-11-11 | End: 2022-11-14

## 2022-11-11 RX ORDER — PANTOPRAZOLE SODIUM 40 MG/10ML
40 INJECTION, POWDER, LYOPHILIZED, FOR SOLUTION INTRAVENOUS ONCE
Status: COMPLETED | OUTPATIENT
Start: 2022-11-11 | End: 2022-11-11

## 2022-11-11 RX ADMIN — ONDANSETRON 4 MG: 2 INJECTION INTRAMUSCULAR; INTRAVENOUS at 20:18

## 2022-11-11 RX ADMIN — SODIUM CHLORIDE 1000 ML: 0.9 INJECTION, SOLUTION INTRAVENOUS at 20:18

## 2022-11-11 RX ADMIN — IOHEXOL 100 ML: 350 INJECTION, SOLUTION INTRAVENOUS at 21:49

## 2022-11-11 RX ADMIN — DICYCLOMINE HYDROCHLORIDE 20 MG: 20 TABLET ORAL at 23:16

## 2022-11-11 RX ADMIN — PANTOPRAZOLE SODIUM 40 MG: 40 INJECTION, POWDER, FOR SOLUTION INTRAVENOUS at 20:18

## 2022-11-11 RX ADMIN — MORPHINE SULFATE 6 MG: 2 INJECTION, SOLUTION INTRAMUSCULAR; INTRAVENOUS at 20:24

## 2022-11-12 NOTE — ED PROVIDER NOTES
Patient signed out to me by Dr Sharron Valenzuela  Patient is a 34year old male with RLQ pain associated w/ n/v  Labs WNL  Pending CT    10:48 PM  CT finds  "No acute abdominopelvic pathology seen      Small left femoral head lucent lesion which may be a degenerative cyst but is more apparent than on prior imaging  Correlate for any hip pain and follow-up as deemed clinically appropriate with surveillance plain films or perhaps MRI of the hip      Small left renal cyst of about 7 mm which is more prominent than on the prior study, but without worrisome features"    10:57 PM   Patient and family updated on CT findings  He states that he has had this problem in the past   He reports that he has been here before for it  He has had a history of gastroparesis and did have an EGD and colonoscopy however this was greater than 5 years ago  He does report is scheduled with a gastroenterologist this month  Discussed with patient and family will DC home  He has follow-up with Gastroenterology  Also he was made aware of renal cyst and cyst on his left femur  Will refer to Ortho for further follow-up  Will give Bentyl here as well as Zofran for home with return to ER instructions given  Patient is alert oriented x3, cranial nerves 2-12 grossly intact  EOMI  PERRLA  No respiratory distress  Abdomen is soft with tenderness to the right lower quadrant but no rebound or guarding  No peritoneal signs      Labs Reviewed   CBC AND DIFFERENTIAL - Abnormal       Result Value Ref Range Status    WBC 9 61  4 31 - 10 16 Thousand/uL Final    RBC 5 04  3 88 - 5 62 Million/uL Final    Hemoglobin 15 7  12 0 - 17 0 g/dL Final    Hematocrit 44 2  36 5 - 49 3 % Final    MCV 88  82 - 98 fL Final    MCH 31 2  26 8 - 34 3 pg Final    MCHC 35 5  31 4 - 37 4 g/dL Final    RDW 11 8  11 6 - 15 1 % Final    MPV 10 2  8 9 - 12 7 fL Final    Platelets 953  605 - 390 Thousands/uL Final    nRBC 0  /100 WBCs Final    Neutrophils Relative 77 (*) 43 - 75 % Final    Immat GRANS % 0  0 - 2 % Final    Lymphocytes Relative 15  14 - 44 % Final    Monocytes Relative 6  4 - 12 % Final    Eosinophils Relative 1  0 - 6 % Final    Basophils Relative 1  0 - 1 % Final    Neutrophils Absolute 7 39  1 85 - 7 62 Thousands/µL Final    Immature Grans Absolute 0 02  0 00 - 0 20 Thousand/uL Final    Lymphocytes Absolute 1 46  0 60 - 4 47 Thousands/µL Final    Monocytes Absolute 0 58  0 17 - 1 22 Thousand/µL Final    Eosinophils Absolute 0 08  0 00 - 0 61 Thousand/µL Final    Basophils Absolute 0 08  0 00 - 0 10 Thousands/µL Final   HEPATIC FUNCTION PANEL - Abnormal    Total Bilirubin 1 03 (*) 0 20 - 1 00 mg/dL Final    Comment: Use of this assay is not recommended for patients undergoing treatment with eltrombopag due to the potential for falsely elevated results  Bilirubin, Direct 0 21 (*) 0 00 - 0 20 mg/dL Final    Alkaline Phosphatase 59  46 - 116 U/L Final    AST 21  5 - 45 U/L Final    Comment: Specimen collection should occur prior to Sulfasalazine administration due to the potential for falsely depressed results  ALT 27  12 - 78 U/L Final    Comment: Specimen collection should occur prior to Sulfasalazine administration due to the potential for falsely depressed results       Total Protein 8 3  6 4 - 8 4 g/dL Final    Albumin 4 4  3 5 - 5 0 g/dL Final   URINE MICROSCOPIC - Abnormal    RBC, UA None Seen  None Seen, 2-4 /hpf Final    WBC, UA 1-2 (*) None Seen, 2-4, 5-60 /hpf Final    Epithelial Cells Occasional  None Seen, Occasional /hpf Final    Bacteria, UA Occasional  None Seen, Occasional /hpf Final    MUCUS THREADS Occasional (*) None Seen Final   URINE MACROSCOPIC, POC - Abnormal    Color, UA Yellow   Final    Clarity, UA Clear   Final    pH, UA 7 5  4 5 - 8 0 Final    Leukocytes, UA Negative  Negative Final    Nitrite, UA Negative  Negative Final    Protein,  (2+) (*) Negative mg/dl Final    Glucose, UA Negative  Negative mg/dl Final    Ketones, UA >=160 (4+) (*) Negative mg/dl Final    Urobilinogen, UA 0 2  0 2, 1 0 E U /dl E U /dl Final    Bilirubin, UA Negative  Negative Final    Occult Blood, UA Negative  Negative Final    Specific Gravity, UA 1 020  1 003 - 1 030 Final    Narrative:     CLINITEK RESULT   LIPASE - Normal    Lipase 87  73 - 393 u/L Final   BASIC METABOLIC PANEL    Sodium 179  135 - 147 mmol/L Final    Potassium 3 6  3 5 - 5 3 mmol/L Final    Chloride 101  96 - 108 mmol/L Final    CO2 28  21 - 32 mmol/L Final    ANION GAP 11  4 - 13 mmol/L Final    BUN 17  5 - 25 mg/dL Final    Creatinine 0 66  0 60 - 1 30 mg/dL Final    Comment: Standardized to IDMS reference method    Glucose 79  65 - 140 mg/dL Final    Comment: If the patient is fasting, the ADA then defines impaired fasting glucose as > 100 mg/dL and diabetes as > or equal to 123 mg/dL  Specimen collection should occur prior to Sulfasalazine administration due to the potential for falsely depressed results  Specimen collection should occur prior to Sulfapyridine administration due to the potential for falsely elevated results  Calcium 9 6  8 3 - 10 1 mg/dL Final    eGFR 127  ml/min/1 73sq m Final    Narrative:     [place] guidelines for Chronic Kidney Disease (CKD):   •  Stage 1 with normal or high GFR (GFR > 90 mL/min/1 73 square meters)  •  Stage 2 Mild CKD (GFR = 60-89 mL/min/1 73 square meters)  •  Stage 3A Moderate CKD (GFR = 45-59 mL/min/1 73 square meters)  •  Stage 3B Moderate CKD (GFR = 30-44 mL/min/1 73 square meters)  •  Stage 4 Severe CKD (GFR = 15-29 mL/min/1 73 square meters)  •  Stage 5 End Stage CKD (GFR <15 mL/min/1 73 square meters)  Note: GFR calculation is accurate only with a steady state creatinine     CT abdomen pelvis with contrast   Final Result      No acute abdominopelvic pathology seen  Small left femoral head lucent lesion which may be a degenerative cyst but is more apparent than on prior imaging    Correlate for any hip pain and follow-up as deemed clinically appropriate with surveillance plain films or perhaps MRI of the hip  Small left renal cyst of about 7 mm which is more prominent than on the prior study, but without worrisome features            Workstation performed: [id number]               Disclosure: Voice to text software was used in the preparation of this document and could have resulted in translational errors  Occasional wrong word or "sound a like" substitutions may have occurred due to the inherent limitations of voice recognition software  Read the chart carefully and recognize, using context, where substitutions have occurred            [address] Dr Daniele Jules, DO  11/11/22 [facility], DO  11/11/22 6237

## 2022-11-12 NOTE — DISCHARGE INSTRUCTIONS
YOU HAVE A CYST ON YOUR RIGHT FEMORAL HEAD  PLEASE CALL ORTHOPEDICS OR YOUR FAMILY DOCTOR FOR FOLLOW-UP  YOU ALSO HAVE A CYST ON YOUR LEFT KIDNEY APPROXIMATELY 7 MM  PLEASE CALL AND FOLLOW-UP WITH GASTROENTEROLOGY  B R A T  DIET Your doctor has recommended the B R A T  diet for you or your child until his or her condition improves  This is often used to help control diarrhea and vomiting symptoms  If you or your child can tolerate clear liquids, you may offer: · Bananas · Rice · Applesauce · Toast (and other simple starches such as crackers, potatoes, noodles) Be sure to avoid dairy products, meats, and fatty foods until you or  your child's symptoms are completely better       IF YOU USE GATORADE - SPLIT A BOTTLE IN HALF AND WATER DOWN   TAKE SIPS OF FLUIDS AND DO NOT CHUG    IF YOU USE PEPTO-BISMOL, IT CAN TURN YOUR TONGUE AND OR STOOLS BLACK

## 2022-11-12 NOTE — ED PROVIDER NOTES
History  Chief Complaint   Patient presents with   • Abdominal Pain     With RLQ pain since 9 this am also c/o vomiting states noted blood in emesis      33 yo M h/o gastritis presenting for evaluation of abdominal pain  Pain started today, RLQ, constant, severe, increasing in severity, worse with movements  Associated with nausea and numerous episodes of vomiting  Reports noticing blood in 1st episode of vomiting, but not in repeat episodes  Admits to drinking alcohol last night around 3 am  Gastritis, on Protonix  No abdominal SHx    MDM: 33 yo M with abdominal pain/N/V/episode hematemesis- symptomatic management, protonix, abd labs/CT, dispo pending workup               Prior to Admission Medications   Prescriptions Last Dose Informant Patient Reported? Taking? albuterol (PROVENTIL HFA,VENTOLIN HFA) 90 mcg/act inhaler   Yes No   Sig: Inhale 2 puffs every 6 (six) hours as needed for wheezing   cholecalciferol (VITAMIN D3) 1,000 units tablet   Yes Yes   Sig: Take 1,000 Units by mouth daily   fluticasone (FLONASE) 50 mcg/act nasal spray   No No   Si spray into each nostril daily   metoclopramide (REGLAN) 10 mg tablet Not Taking at Unknown time  No No   Sig: Take 1 tablet (10 mg total) by mouth every 6 (six) hours   Patient not taking: Reported on 2022   pantoprazole (PROTONIX) 40 mg tablet   Yes No   Sig: Take 40 mg by mouth daily      Facility-Administered Medications: None       Past Medical History:   Diagnosis Date   • Asthma    • Gastritis    • Migraine        Past Surgical History:   Procedure Laterality Date   • WRIST SURGERY         Family History   Family history unknown: Yes     I have reviewed and agree with the history as documented      E-Cigarette/Vaping   • E-Cigarette Use Never User      E-Cigarette/Vaping Substances     Social History     Tobacco Use   • Smoking status: Current Some Day Smoker   • Smokeless tobacco: Never Used   Vaping Use   • Vaping Use: Never used   Substance Use Topics   • Alcohol use: Yes   • Drug use: No       Review of Systems   Constitutional: Negative for chills, fever and unexpected weight change  HENT: Negative for ear pain, rhinorrhea and sore throat  Eyes: Negative for pain and visual disturbance  Respiratory: Negative for cough and shortness of breath  Cardiovascular: Negative for chest pain and leg swelling  Gastrointestinal: Positive for abdominal pain, nausea and vomiting  Negative for constipation and diarrhea  Endocrine: Negative for polydipsia, polyphagia and polyuria  Genitourinary: Negative for dysuria, frequency, hematuria and urgency  Musculoskeletal: Negative for back pain, myalgias and neck pain  Skin: Negative for color change and rash  Allergic/Immunologic: Negative for environmental allergies and immunocompromised state  Neurological: Negative for dizziness, weakness, light-headedness, numbness and headaches  Hematological: Negative for adenopathy  Does not bruise/bleed easily  Psychiatric/Behavioral: Negative for agitation and confusion  All other systems reviewed and are negative  Physical Exam  Physical Exam  Vitals and nursing note reviewed  Constitutional:       General: He is not in acute distress  Appearance: He is well-developed  HENT:      Head: Normocephalic and atraumatic  Nose: Nose normal    Eyes:      Conjunctiva/sclera: Conjunctivae normal    Cardiovascular:      Rate and Rhythm: Normal rate and regular rhythm  Heart sounds: Normal heart sounds  Pulmonary:      Effort: Pulmonary effort is normal  No respiratory distress  Breath sounds: Normal breath sounds  No stridor  No wheezing or rales  Chest:      Chest wall: No tenderness  Abdominal:      General: There is no distension  Palpations: Abdomen is soft  Tenderness: There is abdominal tenderness in the right lower quadrant  There is no guarding or rebound        Comments: Tender to palpation entire R abdomen, most tender RLQ, involuntary guarding, no rebound   Musculoskeletal:         General: No swelling or deformity  Cervical back: Normal range of motion and neck supple  Skin:     General: Skin is warm and dry  Findings: No rash  Neurological:      General: No focal deficit present  Mental Status: He is alert and oriented to person, place, and time  Motor: No abnormal muscle tone  Coordination: Coordination normal    Psychiatric:         Thought Content:  Thought content normal          Judgment: Judgment normal          Vital Signs  ED Triage Vitals [11/11/22 1901]   Temperature Pulse Respirations Blood Pressure SpO2   98 8 °F (37 1 °C) 72 18 145/82 100 %      Temp Source Heart Rate Source Patient Position - Orthostatic VS BP Location FiO2 (%)   Oral Monitor Sitting Right arm --      Pain Score       10 - Worst Possible Pain           Vitals:    11/11/22 1901 11/11/22 2155 11/11/22 2317   BP: 145/82 123/59 116/65   Pulse: 72 77 83   Patient Position - Orthostatic VS: Sitting Sitting Sitting         Visual Acuity      ED Medications  Medications   morphine injection 6 mg (6 mg Intravenous Given 11/11/22 2024)   ondansetron (ZOFRAN) injection 4 mg (4 mg Intravenous Given 11/11/22 2018)   pantoprazole (PROTONIX) injection 40 mg (40 mg Intravenous Given 11/11/22 2018)   sodium chloride 0 9 % bolus 1,000 mL (0 mL Intravenous Stopped 11/11/22 2310)   iohexol (OMNIPAQUE) 350 MG/ML injection (SINGLE-DOSE) 100 mL (100 mL Intravenous Given 11/11/22 2149)   dicyclomine (BENTYL) tablet 20 mg (20 mg Oral Given 11/11/22 2316)       Diagnostic Studies  Results Reviewed     Procedure Component Value Units Date/Time    Urine Microscopic [[id number]]  (Abnormal) Collected: 11/11/22 2158    Lab Status: Final result Specimen: Urine, Clean Catch Updated: 11/11/22 2257     RBC, UA None Seen /hpf      WBC, UA 1-2 /hpf      Epithelial Cells Occasional /hpf      Bacteria, UA Occasional /hpf      MUCUS THREADS Occasional    Urine Macroscopic, POC [[id number]]  (Abnormal) Collected: 11/11/22 2158    Lab Status: Final result Specimen: Urine Updated: 11/11/22 2200     Color, UA Yellow     Clarity, UA Clear     pH, UA 7 5     Leukocytes, UA Negative     Nitrite, UA Negative     Protein,  (2+) mg/dl      Glucose, UA Negative mg/dl      Ketones, UA >=160 (4+) mg/dl      Urobilinogen, UA 0 2 E U /dl      Bilirubin, UA Negative     Occult Blood, UA Negative     Specific Gravity, UA 1 020    Narrative:      CLINITEK RESULT    Basic metabolic panel [[id number]] Collected: 11/11/22 2015    Lab Status: Final result Specimen: Blood from Arm, Right Updated: 11/11/22 2110     Sodium 140 mmol/L      Potassium 3 6 mmol/L      Chloride 101 mmol/L      CO2 28 mmol/L      ANION GAP 11 mmol/L      BUN 17 mg/dL      Creatinine 0 66 mg/dL      Glucose 79 mg/dL      Calcium 9 6 mg/dL      eGFR 127 ml/min/1 73sq m     Narrative:      [facility] guidelines for Chronic Kidney Disease (CKD):   •  Stage 1 with normal or high GFR (GFR > 90 mL/min/1 73 square meters)  •  Stage 2 Mild CKD (GFR = 60-89 mL/min/1 73 square meters)  •  Stage 3A Moderate CKD (GFR = 45-59 mL/min/1 73 square meters)  •  Stage 3B Moderate CKD (GFR = 30-44 mL/min/1 73 square meters)  •  Stage 4 Severe CKD (GFR = 15-29 mL/min/1 73 square meters)  •  Stage 5 End Stage CKD (GFR <15 mL/min/1 73 square meters)  Note: GFR calculation is accurate only with a steady state creatinine    Lipase [[id number]]  (Normal) Collected: 11/11/22 2015    Lab Status: Final result Specimen: Blood from Arm, Right Updated: 11/11/22 2110     Lipase 87 u/L     Hepatic function panel [[id number]]  (Abnormal) Collected: 11/11/22 2015    Lab Status: Final result Specimen: Blood from Arm, Right Updated: 11/11/22 2110     Total Bilirubin 1 03 mg/dL      Bilirubin, Direct 0 21 mg/dL      Alkaline Phosphatase 59 U/L      AST 21 U/L      ALT 27 U/L      Total Protein 8 3 g/dL Albumin 4 4 g/dL     CBC and differential [[id number]]  (Abnormal) Collected: 11/11/22 2015    Lab Status: Final result Specimen: Blood from Arm, Right Updated: 11/11/22 2040     WBC 9 61 Thousand/uL      RBC 5 04 Million/uL      Hemoglobin 15 7 g/dL      Hematocrit 44 2 %      MCV 88 fL      MCH 31 2 pg      MCHC 35 5 g/dL      RDW 11 8 %      MPV 10 2 fL      Platelets 996 Thousands/uL      nRBC 0 /100 WBCs      Neutrophils Relative 77 %      Immat GRANS % 0 %      Lymphocytes Relative 15 %      Monocytes Relative 6 %      Eosinophils Relative 1 %      Basophils Relative 1 %      Neutrophils Absolute 7 39 Thousands/µL      Immature Grans Absolute 0 02 Thousand/uL      Lymphocytes Absolute 1 46 Thousands/µL      Monocytes Absolute 0 58 Thousand/µL      Eosinophils Absolute 0 08 Thousand/µL      Basophils Absolute 0 08 Thousands/µL                  CT abdomen pelvis with contrast   Final Result by Gamaliel Ackerman MD (11/11 2241)      No acute abdominopelvic pathology seen  Small left femoral head lucent lesion which may be a degenerative cyst but is more apparent than on prior imaging  Correlate for any hip pain and follow-up as deemed clinically appropriate with surveillance plain films or perhaps MRI of the hip  Small left renal cyst of about 7 mm which is more prominent than on the prior study, but without worrisome features            Workstation performed: [id number]                    Procedures  Procedures         ED Course                               SBIRT 20yo+    Flowsheet Row Most Recent Value   SBIRT (23 yo +)    In order to provide better care to our patients, we are screening all of our patients for alcohol and drug use  Would it be okay to ask you these screening questions?  No Filed at: 11/11/2022 2150                    MDM  Number of Diagnoses or Management Options  Cyst of bone  Nausea and vomiting  Renal cyst, left  Right lower quadrant abdominal pain  Diagnosis management comments: 33 yo M presenting with RLQ pain/N/V  Signed out to Dr Sarath Johnson pending CT read for dispo       Amount and/or Complexity of Data Reviewed  Clinical lab tests: ordered and reviewed  Tests in the radiology section of CPT®: ordered  Tests in the medicine section of CPT®: ordered and reviewed  Review and summarize past medical records: yes        Disposition  Final diagnoses:   Right lower quadrant abdominal pain   Nausea and vomiting   Renal cyst, left   Cyst of bone     Time reflects when diagnosis was documented in both MDM as applicable and the Disposition within this note     Time User Action Codes Description Comment    11/11/2022  9:38 PM Marco Fluke A Add [R10 31] Right lower quadrant abdominal pain     11/11/2022  9:38 PM [place] Fluke A Add [R11 2] Nausea and vomiting     11/11/2022 10:59 PM [facility], [address] [place] [address] [N28 1] Renal cyst, left     11/11/2022 10:59 PM Sarath Pair Add [M85 60] Cyst of bone       ED Disposition     ED Disposition   Discharge    Condition   Stable    Date/Time   Fri Nov 11, 2022 10:59 PM    Comment   Erik Denis discharge to home/self care                 Follow-up Information     Follow up With Specialties Details Why Contact Info Additional Information    Madan Otto MD Family Medicine Schedule an appointment as soon as possible for a visit in 1 week  [address] [place] [address] Orthopedic Surgery Schedule an appointment as soon as possible for a visit in 1 week  [address] [place] [id number]  [address] [place] [address], [phone]          Discharge Medication List as of 11/11/2022 11:02 PM      START taking these medications    Details   dicyclomine (BENTYL) 20 mg tablet Take 1 tablet (20 mg total) by mouth 2 (two) times a day as needed (abd pain) for up to 3 days, Starting Fri 11/11/2022, Until Mon 11/14/2022 at 2359, Normal      ondansetron (Zofran ODT) 4 mg disintegrating tablet Take 1 tablet (4 mg total) by mouth every 8 (eight) hours as needed for nausea or vomiting for up to 2 days, Starting Fri 11/11/2022, Until Sun 11/13/2022 at 2359, Normal         CONTINUE these medications which have NOT CHANGED    Details   cholecalciferol (VITAMIN D3) 1,000 units tablet Take 1,000 Units by mouth daily, Historical Med      albuterol (PROVENTIL HFA,VENTOLIN HFA) 90 mcg/act inhaler Inhale 2 puffs every 6 (six) hours as needed for wheezing, Historical Med      fluticasone (FLONASE) 50 mcg/act nasal spray 1 spray into each nostril daily, Starting Thu 5/16/2019, Print      metoclopramide (REGLAN) 10 mg tablet Take 1 tablet (10 mg total) by mouth every 6 (six) hours, Starting Sun 7/18/2021, Normal      pantoprazole (PROTONIX) 40 mg tablet Take 40 mg by mouth daily, Historical Med             No discharge procedures on file      PDMP Review     None          ED Provider  Electronically Signed by           Rachel Price, DO  11/13/22 6279

## 2024-01-25 ENCOUNTER — HOSPITAL ENCOUNTER (EMERGENCY)
Facility: HOSPITAL | Age: 34
End: 2024-01-26
Attending: EMERGENCY MEDICINE
Payer: COMMERCIAL

## 2024-01-25 ENCOUNTER — APPOINTMENT (EMERGENCY)
Dept: CT IMAGING | Facility: HOSPITAL | Age: 34
End: 2024-01-25
Payer: COMMERCIAL

## 2024-01-25 DIAGNOSIS — F12.90 MARIJUANA USE: ICD-10-CM

## 2024-01-25 DIAGNOSIS — F32.A DEPRESSION: ICD-10-CM

## 2024-01-25 DIAGNOSIS — F41.9 ANXIETY: Primary | ICD-10-CM

## 2024-01-25 LAB
ALBUMIN SERPL BCP-MCNC: 4.6 G/DL (ref 3.5–5)
ALP SERPL-CCNC: 48 U/L (ref 34–104)
ALT SERPL W P-5'-P-CCNC: 16 U/L (ref 7–52)
AMPHETAMINES SERPL QL SCN: NEGATIVE
ANION GAP SERPL CALCULATED.3IONS-SCNC: 5 MMOL/L
AST SERPL W P-5'-P-CCNC: 14 U/L (ref 13–39)
BACTERIA UR QL AUTO: ABNORMAL /HPF
BARBITURATES UR QL: NEGATIVE
BASOPHILS # BLD AUTO: 0.09 THOUSANDS/ÂΜL (ref 0–0.1)
BASOPHILS NFR BLD AUTO: 1 % (ref 0–1)
BENZODIAZ UR QL: NEGATIVE
BILIRUB SERPL-MCNC: 0.52 MG/DL (ref 0.2–1)
BILIRUB UR QL STRIP: ABNORMAL
BUN SERPL-MCNC: 17 MG/DL (ref 5–25)
CALCIUM SERPL-MCNC: 9.5 MG/DL (ref 8.4–10.2)
CHLORIDE SERPL-SCNC: 102 MMOL/L (ref 96–108)
CLARITY UR: CLEAR
CO2 SERPL-SCNC: 29 MMOL/L (ref 21–32)
COCAINE UR QL: NEGATIVE
COLOR UR: YELLOW
CREAT SERPL-MCNC: 0.8 MG/DL (ref 0.6–1.3)
EOSINOPHIL # BLD AUTO: 0.93 THOUSAND/ÂΜL (ref 0–0.61)
EOSINOPHIL NFR BLD AUTO: 12 % (ref 0–6)
ERYTHROCYTE [DISTWIDTH] IN BLOOD BY AUTOMATED COUNT: 12.1 % (ref 11.6–15.1)
ETHANOL EXG-MCNC: 0 MG/DL
GFR SERPL CREATININE-BSD FRML MDRD: 115 ML/MIN/1.73SQ M
GLUCOSE SERPL-MCNC: 93 MG/DL (ref 65–140)
GLUCOSE UR STRIP-MCNC: NEGATIVE MG/DL
HCT VFR BLD AUTO: 47 % (ref 36.5–49.3)
HGB BLD-MCNC: 15.9 G/DL (ref 12–17)
HGB UR QL STRIP.AUTO: NEGATIVE
IMM GRANULOCYTES # BLD AUTO: 0.01 THOUSAND/UL (ref 0–0.2)
IMM GRANULOCYTES NFR BLD AUTO: 0 % (ref 0–2)
KETONES UR STRIP-MCNC: NEGATIVE MG/DL
LEUKOCYTE ESTERASE UR QL STRIP: NEGATIVE
LYMPHOCYTES # BLD AUTO: 2.69 THOUSANDS/ÂΜL (ref 0.6–4.47)
LYMPHOCYTES NFR BLD AUTO: 35 % (ref 14–44)
MAGNESIUM SERPL-MCNC: 2 MG/DL (ref 1.9–2.7)
MCH RBC QN AUTO: 30.6 PG (ref 26.8–34.3)
MCHC RBC AUTO-ENTMCNC: 33.8 G/DL (ref 31.4–37.4)
MCV RBC AUTO: 90 FL (ref 82–98)
METHADONE UR QL: NEGATIVE
MONOCYTES # BLD AUTO: 0.49 THOUSAND/ÂΜL (ref 0.17–1.22)
MONOCYTES NFR BLD AUTO: 6 % (ref 4–12)
MUCOUS THREADS UR QL AUTO: ABNORMAL
NEUTROPHILS # BLD AUTO: 3.55 THOUSANDS/ÂΜL (ref 1.85–7.62)
NEUTS SEG NFR BLD AUTO: 46 % (ref 43–75)
NITRITE UR QL STRIP: NEGATIVE
NON-SQ EPI CELLS URNS QL MICRO: ABNORMAL /HPF
NRBC BLD AUTO-RTO: 0 /100 WBCS
OPIATES UR QL SCN: NEGATIVE
OXYCODONE+OXYMORPHONE UR QL SCN: NEGATIVE
PCP UR QL: NEGATIVE
PH UR STRIP.AUTO: 5.5 [PH] (ref 4.5–8)
PLATELET # BLD AUTO: 273 THOUSANDS/UL (ref 149–390)
PMV BLD AUTO: 10.1 FL (ref 8.9–12.7)
POTASSIUM SERPL-SCNC: 4 MMOL/L (ref 3.5–5.3)
PROT SERPL-MCNC: 7.6 G/DL (ref 6.4–8.4)
PROT UR STRIP-MCNC: ABNORMAL MG/DL
RBC # BLD AUTO: 5.2 MILLION/UL (ref 3.88–5.62)
RBC #/AREA URNS AUTO: ABNORMAL /HPF
SODIUM SERPL-SCNC: 136 MMOL/L (ref 135–147)
SP GR UR STRIP.AUTO: >=1.03 (ref 1–1.03)
THC UR QL: POSITIVE
TSH SERPL DL<=0.05 MIU/L-ACNC: 3.69 UIU/ML (ref 0.45–4.5)
UROBILINOGEN UR QL STRIP.AUTO: 0.2 E.U./DL
WBC # BLD AUTO: 7.76 THOUSAND/UL (ref 4.31–10.16)
WBC #/AREA URNS AUTO: ABNORMAL /HPF

## 2024-01-25 PROCEDURE — 83735 ASSAY OF MAGNESIUM: CPT

## 2024-01-25 PROCEDURE — 81001 URINALYSIS AUTO W/SCOPE: CPT

## 2024-01-25 PROCEDURE — 80061 LIPID PANEL: CPT | Performed by: NURSE PRACTITIONER

## 2024-01-25 PROCEDURE — 36415 COLL VENOUS BLD VENIPUNCTURE: CPT

## 2024-01-25 PROCEDURE — G1004 CDSM NDSC: HCPCS

## 2024-01-25 PROCEDURE — 85025 COMPLETE CBC W/AUTO DIFF WBC: CPT

## 2024-01-25 PROCEDURE — 82075 ASSAY OF BREATH ETHANOL: CPT

## 2024-01-25 PROCEDURE — 80307 DRUG TEST PRSMV CHEM ANLYZR: CPT

## 2024-01-25 PROCEDURE — G0427 INPT/ED TELECONSULT70: HCPCS | Performed by: GENERAL PRACTICE

## 2024-01-25 PROCEDURE — 70450 CT HEAD/BRAIN W/O DYE: CPT

## 2024-01-25 PROCEDURE — 99285 EMERGENCY DEPT VISIT HI MDM: CPT

## 2024-01-25 PROCEDURE — 80053 COMPREHEN METABOLIC PANEL: CPT

## 2024-01-25 PROCEDURE — 84443 ASSAY THYROID STIM HORMONE: CPT

## 2024-01-25 PROCEDURE — 99284 EMERGENCY DEPT VISIT MOD MDM: CPT

## 2024-01-25 RX ORDER — HYDROXYZINE HYDROCHLORIDE 25 MG/1
25 TABLET, FILM COATED ORAL ONCE
Status: COMPLETED | OUTPATIENT
Start: 2024-01-25 | End: 2024-01-25

## 2024-01-25 RX ADMIN — HYDROXYZINE HYDROCHLORIDE 25 MG: 25 TABLET, FILM COATED ORAL at 20:37

## 2024-01-25 NOTE — ED PROVIDER NOTES
"History  Chief Complaint   Patient presents with    Psychiatric Evaluation     C/o of increased thought activity for a week, can not sleep or eat, no history of psy disorders     34 YOM with PMH asthma, migraines presents today for psych eval. Pt reports he has been struggling with his mental health for the past few weeks. Reports racing thoughts, inability to concentrate at work, unable to sleep. Pt states it has been increasing since his neighbor recently passed away. Pt also states he had multiple near death experiences in his house due to a medical conditions (which was not named) and that this triggers his anxiety. Pt reports he feels his anxiety in his head and chest. Feels like he is having \"out of body experiences\" sometimes. Pt reports he loves his wife and daughter very much and does not want his mental health to get any worse because of them. Denies SI, HI, AH, VH.         Prior to Admission Medications   Prescriptions Last Dose Informant Patient Reported? Taking?   albuterol (PROVENTIL HFA,VENTOLIN HFA) 90 mcg/act inhaler   Yes No   Sig: Inhale 2 puffs every 6 (six) hours as needed for wheezing   cholecalciferol (VITAMIN D3) 1,000 units tablet   Yes No   Sig: Take 1,000 Units by mouth daily   dicyclomine (BENTYL) 20 mg tablet   No No   Sig: Take 1 tablet (20 mg total) by mouth 2 (two) times a day as needed (abd pain) for up to 3 days   fluticasone (FLONASE) 50 mcg/act nasal spray   No No   Si spray into each nostril daily   metoclopramide (REGLAN) 10 mg tablet   No No   Sig: Take 1 tablet (10 mg total) by mouth every 6 (six) hours   Patient not taking: Reported on 2022   ondansetron (Zofran ODT) 4 mg disintegrating tablet   No No   Sig: Take 1 tablet (4 mg total) by mouth every 8 (eight) hours as needed for nausea or vomiting for up to 2 days   pantoprazole (PROTONIX) 40 mg tablet   Yes No   Sig: Take 40 mg by mouth daily      Facility-Administered Medications: None       Past Medical History: "   Diagnosis Date    Asthma     Gastritis     Migraine        Past Surgical History:   Procedure Laterality Date    WRIST SURGERY         Family History   Family history unknown: Yes     I have reviewed and agree with the history as documented.    E-Cigarette/Vaping    E-Cigarette Use Never User      E-Cigarette/Vaping Substances     Social History     Tobacco Use    Smoking status: Some Days    Smokeless tobacco: Never   Vaping Use    Vaping status: Never Used   Substance Use Topics    Alcohol use: Yes    Drug use: No       Review of Systems   Neurological:  Positive for headaches.   Psychiatric/Behavioral:  Positive for decreased concentration and sleep disturbance. Negative for self-injury and suicidal ideas. The patient is nervous/anxious and is hyperactive.    All other systems reviewed and are negative.      Physical Exam  Physical Exam  Vitals and nursing note reviewed.   Constitutional:       General: He is not in acute distress.     Appearance: Normal appearance. He is well-developed. He is not ill-appearing.   HENT:      Head: Normocephalic and atraumatic.   Eyes:      Conjunctiva/sclera: Conjunctivae normal.   Cardiovascular:      Rate and Rhythm: Normal rate and regular rhythm.      Heart sounds: No murmur heard.  Pulmonary:      Effort: Pulmonary effort is normal. No respiratory distress.      Breath sounds: Normal breath sounds.   Abdominal:      Palpations: Abdomen is soft.      Tenderness: There is no abdominal tenderness.   Musculoskeletal:         General: No swelling. Normal range of motion.      Cervical back: Normal range of motion and neck supple.   Skin:     General: Skin is warm and dry.      Capillary Refill: Capillary refill takes less than 2 seconds.   Neurological:      General: No focal deficit present.      Mental Status: He is alert and oriented to person, place, and time.   Psychiatric:         Mood and Affect: Mood normal.         Behavior: Behavior normal.         Vital Signs  ED  Triage Vitals [01/25/24 1613]   Temperature Pulse Respirations Blood Pressure SpO2   98.6 °F (37 °C) 99 20 142/85 98 %      Temp Source Heart Rate Source Patient Position - Orthostatic VS BP Location FiO2 (%)   Oral Monitor Sitting Right arm --      Pain Score       7           Vitals:    01/25/24 1613 01/25/24 2038   BP: 142/85 117/80   Pulse: 99 73   Patient Position - Orthostatic VS: Sitting Sitting         Visual Acuity      ED Medications  Medications   hydrOXYzine HCL (ATARAX) tablet 25 mg (25 mg Oral Given 1/25/24 2037)       Diagnostic Studies  Results Reviewed       Procedure Component Value Units Date/Time    Rapid drug screen, urine [[id number]]  (Abnormal) Collected: 01/25/24 2101    Lab Status: Final result Specimen: Urine, Clean Catch Updated: 01/25/24 2131     Amph/Meth UR Negative     Barbiturate Ur Negative     Benzodiazepine Urine Negative     Cocaine Urine Negative     Methadone Urine Negative     Opiate Urine Negative     PCP Ur Negative     THC Urine Positive     Oxycodone Urine Negative    Narrative:      Presumptive report. If requested, specimen will be sent to reference lab for confirmation.  FOR MEDICAL PURPOSES ONLY.   IF CONFIRMATION NEEDED PLEASE CONTACT THE LAB WITHIN 5 DAYS.    Drug Screen Cutoff Levels:  AMPHETAMINE/METHAMPHETAMINES  1000 ng/mL  BARBITURATES     200 ng/mL  BENZODIAZEPINES     200 ng/mL  COCAINE      300 ng/mL  METHADONE      300 ng/mL  OPIATES      300 ng/mL  PHENCYCLIDINE     25 ng/mL  THC       50 ng/mL  OXYCODONE      100 ng/mL    Urine Microscopic [[id number]]  (Abnormal) Collected: 01/25/24 2104    Lab Status: Final result Specimen: Urine, Clean Catch Updated: 01/25/24 2123     RBC, UA None Seen /hpf      WBC, UA 2-4 /hpf      Epithelial Cells Occasional /hpf      Bacteria, UA Occasional /hpf      MUCUS THREADS Innumerable    Urine Macroscopic, POC [[id number]]  (Abnormal) Collected: 01/25/24 2104    Lab Status: Final result Specimen: Urine Updated: 01/25/24 2106      Color, UA Yellow     Clarity, UA Clear     pH, UA 5.5     Leukocytes, UA Negative     Nitrite, UA Negative     Protein, UA Trace mg/dl      Glucose, UA Negative mg/dl      Ketones, UA Negative mg/dl      Urobilinogen, UA 0.2 E.U./dl      Bilirubin, UA Small     Occult Blood, UA Negative     Specific Gravity, UA >=1.030    Narrative:      CLINITEK RESULT    TSH, 3rd generation with Free T4 reflex [[id number]]  (Normal) Collected: 01/25/24 1721    Lab Status: Final result Specimen: Blood from Arm, Right Updated: 01/25/24 1817     TSH 3RD GENERATON 3.690 uIU/mL     Comprehensive metabolic panel [[id number]] Collected: 01/25/24 1721    Lab Status: Final result Specimen: Blood from Arm, Right Updated: 01/25/24 1803     Sodium 136 mmol/L      Potassium 4.0 mmol/L      Chloride 102 mmol/L      CO2 29 mmol/L      ANION GAP 5 mmol/L      BUN 17 mg/dL      Creatinine 0.80 mg/dL      Glucose 93 mg/dL      Calcium 9.5 mg/dL      AST 14 U/L      ALT 16 U/L      Alkaline Phosphatase 48 U/L      Total Protein 7.6 g/dL      Albumin 4.6 g/dL      Total Bilirubin 0.52 mg/dL      eGFR 115 ml/min/1.73sq m     Narrative:      [employer] guidelines for Chronic Kidney Disease (CKD):     Stage 1 with normal or high GFR (GFR > 90 mL/min/1.73 square meters)    Stage 2 Mild CKD (GFR = 60-89 mL/min/1.73 square meters)    Stage 3A Moderate CKD (GFR = 45-59 mL/min/1.73 square meters)    Stage 3B Moderate CKD (GFR = 30-44 mL/min/1.73 square meters)    Stage 4 Severe CKD (GFR = 15-29 mL/min/1.73 square meters)    Stage 5 End Stage CKD (GFR <15 mL/min/1.73 square meters)  Note: GFR calculation is accurate only with a steady state creatinine    Magnesium [[id number]]  (Normal) Collected: 01/25/24 1721    Lab Status: Final result Specimen: Blood from Arm, Right Updated: 01/25/24 1803     Magnesium 2.0 mg/dL     CBC and differential [[id number]]  (Abnormal) Collected: 01/25/24 1721    Lab Status: Final result Specimen: Blood from  Arm, Right Updated: 01/25/24 1742     WBC 7.76 Thousand/uL      RBC 5.20 Million/uL      Hemoglobin 15.9 g/dL      Hematocrit 47.0 %      MCV 90 fL      MCH 30.6 pg      MCHC 33.8 g/dL      RDW 12.1 %      MPV 10.1 fL      Platelets 273 Thousands/uL      nRBC 0 /100 WBCs      Neutrophils Relative 46 %      Immat GRANS % 0 %      Lymphocytes Relative 35 %      Monocytes Relative 6 %      Eosinophils Relative 12 %      Basophils Relative 1 %      Neutrophils Absolute 3.55 Thousands/µL      Immature Grans Absolute 0.01 Thousand/uL      Lymphocytes Absolute 2.69 Thousands/µL      Monocytes Absolute 0.49 Thousand/µL      Eosinophils Absolute 0.93 Thousand/µL      Basophils Absolute 0.09 Thousands/µL     POCT alcohol breath test [[id number]]  (Normal) Resulted: 01/25/24 1715    Lab Status: Final result Updated: 01/25/24 1715     EXTBreath Alcohol 0.000                   CT head without contrast   Final Result by Scooter Melendrez MD (01/25 1748)      No acute intracranial abnormality.                  Workstation performed: [id number]                    Procedures  Procedures         ED Course  ED Course as of 01/25/24 2353   Thu Jan 25, 2024   1820 Pt is medically cleared    2008 Dr. Dominguez reccomends inpatient    2353 S/o to  PA-C: pending transport                                SBIRT 20yo+      Flowsheet Row Most Recent Value   Initial Alcohol Screen: US AUDIT-C     1. How often do you have a drink containing alcohol? 0 Filed at: 01/25/2024 1646   2. How many drinks containing alcohol do you have on a typical day you are drinking?  0 Filed at: 01/25/2024 1646   3a. Male UNDER 65: How often do you have five or more drinks on one occasion? 0 Filed at: 01/25/2024 1646   Audit-C Score 0 Filed at: 01/25/2024 1646   CAMELIA: How many times in the past year have you...    Used an illegal drug or used a prescription medication for non-medical reasons? Never Filed at: 01/25/2024 1646                      Medical Decision  Making    Psych consult placed and ultimately inpatient was recommended. Pt offered a 201 and pt agreed to sign.     Signed out to Leslie Anton PA-C: pending placement.     Problems Addressed:  Anxiety: acute illness or injury  Depression: acute illness or injury    Amount and/or Complexity of Data Reviewed  Labs: ordered.  Radiology: ordered.    Risk  Prescription drug management.  Decision regarding hospitalization.             Disposition  Final diagnoses:   Anxiety   Depression     Time reflects when diagnosis was documented in both MDM as applicable and the Disposition within this note       Time User Action Codes Description Comment    1/25/2024  5:42 PM Danna Noriega Add [F41.9] Anxiety     1/25/2024  5:42 PM Danna Noriega Add [F32.A] Depression           ED Disposition       ED Disposition   Transfer to Behavioral Health Condition   --    Date/Time   Thu Jan 25, 2024 2210    Comment   Neville Moulton should be transferred out to [facility] and has been medically cleared.               MD Documentation      Flowsheet Row Most Recent Value   Sending MD Wilbert Rich MD          Follow-up Information    None         Patient's Medications   Discharge Prescriptions    No medications on file       No discharge procedures on file.    PDMP Review       None            ED Provider  Electronically Signed by             Danna Noriega PA-C  01/25/24 9204     Oxycontin 10 mg @ 10 pm

## 2024-01-25 NOTE — ED NOTES
Pt is a 34 year old male who presented to the ED with anxiety. Pt reports experiencing constant, general anxiety. Pt states it has been increasing since his neighbor recently passed away. Pt also states he had multiple near death experiences in his house due to a medical conditions (which was not named) and that this triggers his anxiety. Pt reports he has also been having pain in his head and chest pain. Pt denies any previous MH treatment and reports that he does not talk to anyone, even his wife, about his feelings or things that are bothering him, which results in pt bottling up these emotions. Pt states he wants to feel happy and does not want his family to see him like this. Pt reports poor sleep and inability to concentrate due to anxiety and ruminating thoughts. Pt states he came in today because he is tired of feeling this way and wants to get better for his family and does not want to lose his job. Pt lives with his wife and 5 year old daughter. His hobbies include cars and collecting firearms, which are secured safely. Pt denies any SI/HI/AVH and denies any previous SA or self harm. Pt states this is his first time opening up about this and seeking help but it has been going on for quite some time. Pt is currently thinking about different treatment options that can be explored, including IP and PHP. These options were explained to patient and questions were answered.

## 2024-01-25 NOTE — ED NOTES
Spoke with patient about the option of PHP. Pt is worried about waiting to start the program and wants help now. Pt reports he is having constant worries and describes that his head cannot take it anymore. Pt is worried about turning into a different person around his family and at work because of the anxiety, depression, and lack of sleep. Patient reports he is open to starting medication if that is what is recommended and wants to sign himself into inpatient. Notified pt that a psych consult will be placed and that the psychiatrist will meet with him to determine the best and safest level of care at this time.

## 2024-01-26 ENCOUNTER — HOSPITAL ENCOUNTER (INPATIENT)
Facility: HOSPITAL | Age: 34
LOS: 6 days | Discharge: HOME | DRG: 885 | End: 2024-02-01
Attending: PSYCHIATRY & NEUROLOGY | Admitting: PSYCHIATRY & NEUROLOGY
Payer: COMMERCIAL

## 2024-01-26 VITALS
SYSTOLIC BLOOD PRESSURE: 135 MMHG | WEIGHT: 222.66 LBS | OXYGEN SATURATION: 100 % | TEMPERATURE: 98.6 F | DIASTOLIC BLOOD PRESSURE: 97 MMHG | RESPIRATION RATE: 16 BRPM | BODY MASS INDEX: 31.95 KG/M2 | HEART RATE: 67 BPM

## 2024-01-26 DIAGNOSIS — J45.909 ASTHMA: ICD-10-CM

## 2024-01-26 DIAGNOSIS — G47.9 DIFFICULTY SLEEPING: Chronic | ICD-10-CM

## 2024-01-26 DIAGNOSIS — F32.2 CURRENT SEVERE EPISODE OF MAJOR DEPRESSIVE DISORDER WITHOUT PSYCHOTIC FEATURES WITHOUT PRIOR EPISODE (HCC): Primary | ICD-10-CM

## 2024-01-26 DIAGNOSIS — F41.9 ANXIETY: ICD-10-CM

## 2024-01-26 DIAGNOSIS — E55.9 VITAMIN D DEFICIENCY: ICD-10-CM

## 2024-01-26 DIAGNOSIS — E53.8 VITAMIN B12 DEFICIENCY: ICD-10-CM

## 2024-01-26 DIAGNOSIS — Z72.0 TOBACCO ABUSE: ICD-10-CM

## 2024-01-26 DIAGNOSIS — F12.90 MARIJUANA USE: ICD-10-CM

## 2024-01-26 RX ORDER — HYDROXYZINE HYDROCHLORIDE 25 MG/1
50 TABLET, FILM COATED ORAL
Status: CANCELLED | OUTPATIENT
Start: 2024-01-26

## 2024-01-26 RX ORDER — OLANZAPINE 10 MG/2ML
2.5 INJECTION, POWDER, FOR SOLUTION INTRAMUSCULAR
Status: CANCELLED | OUTPATIENT
Start: 2024-01-26

## 2024-01-26 RX ORDER — HYDROXYZINE 50 MG/1
50 TABLET, FILM COATED ORAL
Status: DISCONTINUED | OUTPATIENT
Start: 2024-01-26 | End: 2024-02-01 | Stop reason: HOSPADM

## 2024-01-26 RX ORDER — POLYETHYLENE GLYCOL 3350 17 G/17G
17 POWDER, FOR SOLUTION ORAL DAILY PRN
Status: CANCELLED | OUTPATIENT
Start: 2024-01-26

## 2024-01-26 RX ORDER — OLANZAPINE 5 MG/1
5 TABLET ORAL
Status: CANCELLED | OUTPATIENT
Start: 2024-01-26

## 2024-01-26 RX ORDER — POLYETHYLENE GLYCOL 3350 17 G/17G
17 POWDER, FOR SOLUTION ORAL DAILY PRN
Status: DISCONTINUED | OUTPATIENT
Start: 2024-01-26 | End: 2024-02-01 | Stop reason: HOSPADM

## 2024-01-26 RX ORDER — MAGNESIUM HYDROXIDE/ALUMINUM HYDROXICE/SIMETHICONE 120; 1200; 1200 MG/30ML; MG/30ML; MG/30ML
30 SUSPENSION ORAL EVERY 4 HOURS PRN
Status: DISCONTINUED | OUTPATIENT
Start: 2024-01-26 | End: 2024-02-01 | Stop reason: HOSPADM

## 2024-01-26 RX ORDER — HYDROXYZINE 50 MG/1
100 TABLET, FILM COATED ORAL
Status: DISCONTINUED | OUTPATIENT
Start: 2024-01-26 | End: 2024-02-01 | Stop reason: HOSPADM

## 2024-01-26 RX ORDER — TRAZODONE HYDROCHLORIDE 100 MG/1
100 TABLET ORAL
Status: DISCONTINUED | OUTPATIENT
Start: 2024-01-26 | End: 2024-01-29

## 2024-01-26 RX ORDER — ALBUTEROL SULFATE 90 UG/1
2 AEROSOL, METERED RESPIRATORY (INHALATION) EVERY 6 HOURS PRN
Status: DISCONTINUED | OUTPATIENT
Start: 2024-01-26 | End: 2024-02-01 | Stop reason: HOSPADM

## 2024-01-26 RX ORDER — OLANZAPINE 10 MG/2ML
5 INJECTION, POWDER, FOR SOLUTION INTRAMUSCULAR
Status: DISCONTINUED | OUTPATIENT
Start: 2024-01-26 | End: 2024-02-01 | Stop reason: HOSPADM

## 2024-01-26 RX ORDER — ACETAMINOPHEN 325 MG/1
650 TABLET ORAL EVERY 4 HOURS PRN
Status: CANCELLED | OUTPATIENT
Start: 2024-01-26

## 2024-01-26 RX ORDER — ACETAMINOPHEN 325 MG/1
650 TABLET ORAL EVERY 4 HOURS PRN
Status: DISCONTINUED | OUTPATIENT
Start: 2024-01-26 | End: 2024-02-01 | Stop reason: HOSPADM

## 2024-01-26 RX ORDER — HYDROXYZINE HYDROCHLORIDE 25 MG/1
25 TABLET, FILM COATED ORAL
Status: DISCONTINUED | OUTPATIENT
Start: 2024-01-26 | End: 2024-02-01 | Stop reason: HOSPADM

## 2024-01-26 RX ORDER — DIPHENHYDRAMINE HYDROCHLORIDE 50 MG/ML
50 INJECTION INTRAMUSCULAR; INTRAVENOUS EVERY 6 HOURS PRN
Status: DISCONTINUED | OUTPATIENT
Start: 2024-01-26 | End: 2024-02-01 | Stop reason: HOSPADM

## 2024-01-26 RX ORDER — OLANZAPINE 5 MG/1
5 TABLET ORAL
Status: DISCONTINUED | OUTPATIENT
Start: 2024-01-26 | End: 2024-02-01 | Stop reason: HOSPADM

## 2024-01-26 RX ORDER — LORAZEPAM 2 MG/ML
2 INJECTION INTRAMUSCULAR EVERY 6 HOURS PRN
Status: CANCELLED | OUTPATIENT
Start: 2024-01-26

## 2024-01-26 RX ORDER — ACETAMINOPHEN 325 MG/1
975 TABLET ORAL EVERY 6 HOURS PRN
Status: DISCONTINUED | OUTPATIENT
Start: 2024-01-26 | End: 2024-02-01 | Stop reason: HOSPADM

## 2024-01-26 RX ORDER — HYDROXYZINE HYDROCHLORIDE 25 MG/1
100 TABLET, FILM COATED ORAL
Status: CANCELLED | OUTPATIENT
Start: 2024-01-26

## 2024-01-26 RX ORDER — TRAZODONE HYDROCHLORIDE 100 MG/1
100 TABLET ORAL
Status: CANCELLED | OUTPATIENT
Start: 2024-01-26

## 2024-01-26 RX ORDER — ACETAMINOPHEN 325 MG/1
650 TABLET ORAL EVERY 6 HOURS PRN
Status: DISCONTINUED | OUTPATIENT
Start: 2024-01-26 | End: 2024-02-01 | Stop reason: HOSPADM

## 2024-01-26 RX ORDER — OLANZAPINE 2.5 MG/1
2.5 TABLET, FILM COATED ORAL
Status: DISCONTINUED | OUTPATIENT
Start: 2024-01-26 | End: 2024-02-01 | Stop reason: HOSPADM

## 2024-01-26 RX ORDER — OLANZAPINE 10 MG/2ML
2.5 INJECTION, POWDER, FOR SOLUTION INTRAMUSCULAR
Status: DISCONTINUED | OUTPATIENT
Start: 2024-01-26 | End: 2024-02-01 | Stop reason: HOSPADM

## 2024-01-26 RX ORDER — ACETAMINOPHEN 325 MG/1
975 TABLET ORAL EVERY 6 HOURS PRN
Status: CANCELLED | OUTPATIENT
Start: 2024-01-26

## 2024-01-26 RX ORDER — OLANZAPINE 5 MG/1
2.5 TABLET ORAL
Status: CANCELLED | OUTPATIENT
Start: 2024-01-26

## 2024-01-26 RX ORDER — LORAZEPAM 2 MG/ML
2 INJECTION INTRAMUSCULAR EVERY 6 HOURS PRN
Status: DISCONTINUED | OUTPATIENT
Start: 2024-01-26 | End: 2024-02-01 | Stop reason: HOSPADM

## 2024-01-26 RX ORDER — HYDROXYZINE HYDROCHLORIDE 25 MG/1
25 TABLET, FILM COATED ORAL
Status: CANCELLED | OUTPATIENT
Start: 2024-01-26

## 2024-01-26 RX ORDER — ACETAMINOPHEN 325 MG/1
650 TABLET ORAL EVERY 6 HOURS PRN
Status: CANCELLED | OUTPATIENT
Start: 2024-01-26

## 2024-01-26 RX ORDER — OLANZAPINE 10 MG/2ML
5 INJECTION, POWDER, FOR SOLUTION INTRAMUSCULAR
Status: CANCELLED | OUTPATIENT
Start: 2024-01-26

## 2024-01-26 RX ORDER — DIPHENHYDRAMINE HYDROCHLORIDE 50 MG/ML
50 INJECTION INTRAMUSCULAR; INTRAVENOUS EVERY 6 HOURS PRN
Status: CANCELLED | OUTPATIENT
Start: 2024-01-26

## 2024-01-26 RX ORDER — MAGNESIUM HYDROXIDE/ALUMINUM HYDROXICE/SIMETHICONE 120; 1200; 1200 MG/30ML; MG/30ML; MG/30ML
30 SUSPENSION ORAL EVERY 4 HOURS PRN
Status: CANCELLED | OUTPATIENT
Start: 2024-01-26

## 2024-01-26 RX ADMIN — TRAZODONE HYDROCHLORIDE 100 MG: 100 TABLET ORAL at 21:33

## 2024-01-26 NOTE — LETTER
[facility]  [address] [facility] [address]  [phone]  Dept: [phone]    February 1, 2024     Patient: Neville Moulton   YOB: 1990   Date of Visit: 1/26/2024       To Whom it May Concern:    Neville Moulton is under my professional care. He was seen in the hospital from 01/25/2024 to 02/01/24. He may return to work on 02/02/2024 without limitations.    If you have any questions or concerns, please don't hesitate to call.         Sincerely,          Diana Fajardo

## 2024-01-26 NOTE — EMTALA/ACUTE CARE TRANSFER
[facility] EMERGENCY DEPARTMENT  [address] [facility] [address]  Dept: [phone]      EMTALA TRANSFER CONSENT    NAME Neville Moulton                                         1990                              MRN [id number]    I have been informed of my rights regarding examination, treatment, and transfer   by Dr. Masha Hoffman DO    Benefits: Specialized equipment and/or services available at the receiving facility (Include comment)________________________ (Psych)    Risks: Potential for delay in receiving treatment      Consent for Transfer:  I acknowledge that my medical condition has been evaluated and explained to me by the emergency department physician or other qualified medical person and/or my attending physician, who has recommended that I be transferred to the service of  Accepting Physician: Brooke Gomez at Accepting Facility Name, City & State : [facility]. The above potential benefits of such transfer, the potential risks associated with such transfer, and the probable risks of not being transferred have been explained to me, and I fully understand them.  The doctor has explained that, in my case, the benefits of transfer outweigh the risks.  I agree to be transferred.    I authorize the performance of emergency medical procedures and treatments upon me in both transit and upon arrival at the receiving facility.  Additionally, I authorize the release of any and all medical records to the receiving facility and request they be transported with me, if possible.  I understand that the safest mode of transportation during a medical emergency is an ambulance and that the Hospital advocates the use of this mode of transport. Risks of traveling to the receiving facility by car, including absence of medical control, life sustaining equipment, such as oxygen, and medical personnel has been explained to me and I fully understand them.    (YAZMIN CORRECT BOX BELOW)  [  ]  I  consent to the stated transfer and to be transported by ambulance/helicopter.  [  ]  I consent to the stated transfer, but refuse transportation by ambulance and accept full responsibility for my transportation by car.  I understand the risks of non-ambulance transfers and I exonerate the Hospital and its staff from any deterioration in my condition that results from this refusal.    X___________________________________________    DATE  24  TIME________  Signature of patient or legally responsible individual signing on patient behalf           RELATIONSHIP TO PATIENT_________________________          Provider Certification    NAME Neville Moulton                                         1990                              MRN [id number]    A medical screening exam was performed on the above named patient.  Based on the examination:    Condition Necessitating Transfer The primary encounter diagnosis was Anxiety. Diagnoses of Depression and Marijuana use were also pertinent to this visit.    Patient Condition: The patient has been stabilized such that within reasonable medical probability, no material deterioration of the patient condition or the condition of the unborn child(brenda) is likely to result from the transfer    Reason for Transfer: Level of Care needed not available at this facility    Transfer Requirements: [facility]   Space available and qualified personnel available for treatment as acknowledged by [facility]  Agreed to accept transfer and to provide appropriate medical treatment as acknowledged by       Brooke Gomez  Appropriate medical records of the examination and treatment of the patient are provided at the time of transfer   STAFF INITIAL WHEN COMPLETED _______  Transfer will be performed by qualified personnel from [facility]  and appropriate transfer equipment as required, including the use of necessary and appropriate life support measures.    Provider Certification: I have examined the  patient and explained the following risks and benefits of being transferred/refusing transfer to the patient/family:  The patient is stable for psychiatric transfer because they are medically stable, and is protected from harming him/herself or others during transport      Based on these reasonable risks and benefits to the patient and/or the unborn child(brenda), and based upon the information available at the time of the patient’s examination, I certify that the medical benefits reasonably to be expected from the provision of appropriate medical treatments at another medical facility outweigh the increasing risks, if any, to the individual’s medical condition, and in the case of labor to the unborn child, from effecting the transfer.    X____________________________________________ DATE 01/26/24        TIME_______      ORIGINAL - SEND TO MEDICAL RECORDS   COPY - SEND WITH PATIENT DURING TRANSFER

## 2024-01-26 NOTE — PROGRESS NOTES
Patient came in with these belongings:     With Patient:    Black shirt x 1  Black socks x 1 pair    In patient locker # 14:    Black coat x 1  Sneakers x 1 pair  Green pants x 1    Contraband bin # 2:    Cell phone (Cracked) x 1  Piercing in container x 3      No wallet  No money  No devices    Patient was present and signed belongings sheet.

## 2024-01-26 NOTE — ED NOTES
"Insurance Authorization for admission:   Phone call placed to Micaela   Phone number: [phone]    Spoke to Taty H.  5 days approved.  Level of care: acute inpatient mh  Review on 1/30/24  Authorization # [id number]  [place] [facility] Contact: Wilbert GARRETTGael [phone]      Eligibility Verification System checked - ([phone]).  Online system / automated system indicates: \"not eligible for medical assistance\"    Insurance Authorization for Transportation:  TBD  Phone call placed to **.   Phone number **.   Spoke to **.   Authorization #: **     "

## 2024-01-26 NOTE — CONSULTS
TeleConsultation - Behavioral Health   Neville Moulton 34 y.o. male MRN: [id number]  Unit/Bed#: ED-11 Encounter: [id number]        REQUIRED DOCUMENTATION:     1. This service was provided via Telemedicine.  2. Provider located at [place].  3. TeleMed provider: Charla Dominguez MD.  4. Identify all parties in room with patient during tele consult: Patient   5.Patient was then informed that this was a Telemedicine visit and that the exam was being conducted confidentially over secure lines. My office door was closed. No one else was in the room.  Patient acknowledged consent and understanding of privacy and security of the Telemedicine visit, and gave us permission to have the assistant stay in the room in order to assist with the history and to conduct the exam.  I informed the patient that I have reviewed their record in Epic and presented the opportunity for them to ask any questions regarding the visit today.  The patient agreed to participate.      Discussed with  Wilbert Rich M.D     Assessment/Plan     Present on Admission:  **None**    Assessment:    Unspecified Mood Disorder     Patient presents with worsening mood and anxiety symptoms have limited his ability to care for himself and perform ADLs as such recommend voluntary inpatient psychiatric admission.       Treatment Plan:    Planned Medication Changes:        Current Medications:     Current Facility-Administered Medications   Medication Dose Route Frequency Provider Last Rate    hydrOXYzine HCL  25 mg Oral Once Danna Noriega PA-C         Risks / Benefits of Treatment:    Risks, benefits, and possible side effects of medications explained to patient and patient verbalizes understanding.      Other treatment modalities recommended as indicated:    psychotherapy  outpatient referral      Inpatient consult to Psychiatry  Consult performed by: Charla Dominguez MD  Consult ordered by: Danna Noriega PA-C        Physician Requesting Consult: Wilbert Zhang  MD Hilario  Principal Problem:<principal problem not specified>    Reason for Consult:  Psych Evaluation       History of Present Illness      Per Crisis Evaluation by Chasity Khan:  Pt is a 34 year old male who presented to the ED with anxiety. Pt reports experiencing constant, general anxiety. Pt states it has been increasing since his neighbor recently passed away. Pt also states he had multiple near death experiences in his house due to a medical conditions (which was not named) and that this triggers his anxiety. Pt reports he has also been having pain in his head and chest pain. Pt denies any previous MH treatment and reports that he does not talk to anyone, even his wife, about his feelings or things that are bothering him, which results in pt bottling up these emotions. Pt states he wants to feel happy and does not want his family to see him like this. Pt reports poor sleep and inability to concentrate due to anxiety and ruminating thoughts. Pt states he came in today because he is tired of feeling this way and wants to get better for his family and does not want to lose his job. Pt lives with his wife and 5 year old daughter. His hobbies include cars and collecting firearms, which are secured safely. Pt denies any SI/HI/AVH and denies any previous SA or self harm. Pt states this is his first time opening up about this and seeking help but it has been going on for quite some time. Pt is currently thinking about different treatment options that can be explored, including IP and PHP. These options were explained to patient and questions were answered. Spoke with patient about the option of PHP. Pt is worried about waiting to start the program and wants help now. Pt reports he is having constant worries and describes that his head cannot take it anymore. Pt is worried about turning into a different person around his family and at work because of the anxiety, depression, and lack of sleep. Patient reports he is  open to starting medication if that is what is recommended and wants to sign himself into inpatient. Notified pt that a psych consult will be placed and that the psychiatrist will meet with him to determine the best and safest level of care at this time.     Patient states that he cannot sleep and has lots of issues going through his head. Patient reports feel depression and anxiety and cannot eat and take care of himself.  Patient states that he has been trying to manage his symptoms on his own if he that he is irritable with his family and lashes out.  Patient states that this has been going on for too long and he feels she would not be appropriate to return home.  Patient states that he has a hard time expressing himself is asking for help. Patient without any indication for voluntary or involuntary IP psychiatric admission.         Psychiatric Review Of Systems:    sleep: no  appetite changes: no  weight changes: yes  energy/anergy: yes  interest/pleasure/anhedonia: no  somatic symptoms: no  anxiety/panic: yes  shade: no  guilty/hopeless: no  self injurious behavior/risky behavior: no    Historical Information     Past Psychiatric History:     Psychiatric Hospitalizations:   No history of past inpatient psychiatric admissions  Outpatient Treatment History:   Denied   Suicide Attempts:   None  History of self-harm:   None  Violence History:   no  Past Psychiatric medication trials: Denied     Substance Abuse History: Denied         Family Psychiatric History:  Denied          Social History:     Education: high school diploma/GED  Learning Disabilities:  Denied 2  Marital history:   Children: Yes  Living arrangement, social support: The patient lives in home with wife.  Occupational History: unknown occupation  Functioning Relationships: good support system.  Other Pertinent History: None    Traumatic History:     Abuse: None  Other Traumatic Events: none    Past Medical History:   Diagnosis Date    Asthma      Gastritis     Migraine        Medical Review Of Systems:    Review of Systems    Meds/Allergies     all current active meds have been reviewed  No Known Allergies    Objective     Vital signs in last 24 hours:  Temp:  [98.6 °F (37 °C)] 98.6 °F (37 °C)  HR:  [99] 99  Resp:  [20] 20  BP: (142)/(85) 142/85    No intake or output data in the 24 hours ending 01/25/24 1927    Mental Status Evaluation:    Appearance:  age appropriate   Behavior:  psychomotor retardation   Speech:  normal pitch and normal volume   Mood:  dysthymic   Affect:  constricted   Language: naming objects   Thought Process:  normal   Associations intact associations   Thought Content:  normal   Perceptual Disturbances: None   Risk Potential: Suicidal Ideations none  Homicidal Ideations none  Potential for Aggression No   Sensorium:  person, place, and time/date   Cognition:  recent and remote memory grossly intact   Consciousness:  alert    Attention: attention span and concentration were age appropriate   Intellect: within normal limits   Fund of Knowledge: awareness of current events: President    Insight:  limited   Judgment: limited   Muscle Strength:  Muscle Tone: normal face  normal   Gait/Station: normal gait/station   Motor Activity: no abnormal movements       Lab Results: I have personally reviewed all pertinent laboratory/tests results.     Most Recent Labs:   Lab Results   Component Value Date    WBC 7.76 01/25/2024    RBC 5.20 01/25/2024    HGB 15.9 01/25/2024    HCT 47.0 01/25/2024     01/25/2024    RDW 12.1 01/25/2024    NEUTROABS 3.55 01/25/2024    SODIUM 136 01/25/2024    K 4.0 01/25/2024     01/25/2024    CO2 29 01/25/2024    BUN 17 01/25/2024    CREATININE 0.80 01/25/2024    GLUC 93 01/25/2024    CALCIUM 9.5 01/25/2024    AST 14 01/25/2024    ALT 16 01/25/2024    ALKPHOS 48 01/25/2024    TP 7.6 01/25/2024    ALB 4.6 01/25/2024    TBILI 0.52 01/25/2024    HDL 60 05/13/2015    TRIG 39 05/13/2015    LDLCALC 113 (H)  05/13/2015    ROZ7PQNVFFXZ 3.690 01/25/2024       Imaging Studies: CT head without contrast    Result Date: 1/25/2024  Narrative: CT BRAIN - WITHOUT CONTRAST INDICATION:   Headache, classic migraine headache. COMPARISON: CT brain from 7/11/2012. TECHNIQUE:  CT examination of the brain was performed.  Multiplanar 2D reformatted images were created from the source data. Radiation dose length product (DLP) for this visit:  910 mGy-cm .  This examination, like all CT scans performed in the [facility], was performed utilizing techniques to minimize radiation dose exposure, including the use of iterative reconstruction and automated exposure control. IMAGE QUALITY:  Diagnostic. FINDINGS: PARENCHYMA:  No intracranial mass, mass effect or midline shift. No CT signs of acute infarction.  No acute parenchymal hemorrhage. VENTRICLES AND EXTRA-AXIAL SPACES:  Normal for the patient's age. VISUALIZED ORBITS: Normal visualized orbits. PARANASAL SINUSES: Normal visualized paranasal sinuses. CALVARIUM AND EXTRACRANIAL SOFT TISSUES:  Normal.     Impression: No acute intracranial abnormality. Workstation performed: [id number]     EKG/Pathology/Other Studies:   Lab Results   Component Value Date    VENTRATE 70 07/18/2021    ATRIALRATE 70 07/18/2021    PRINT 148 07/18/2021    QRSDINT 98 07/18/2021    QTINT 394 07/18/2021    QTCINT 425 07/18/2021    PAXIS 24 07/18/2021    QRSAXIS 39 07/18/2021    TWAVEAXIS -6 07/18/2021        Code Status: No Order  Advance Directive and Living Will:      Power of :    POLST:      Screenings:    1. Nutrition Screening  Nutrition Assessment (completed by Staff): Nutrition  Appetite: Good    2. Pain Screening  Pain Screening: Pain Assessment  Pain Assessment Tool: 0-10  Pain Score: 7  Pain Location/Orientation: Location: Chest, Location: Head    3. Suicide Screening  ED Crisis Suicide Risk Assessment: Suicide Risk Assessment  Violence Risk to Self: Denies ideation within past 6  months  Protective Factors: The patient has responsibility and commitment to friends, The patient has desire to live, The patient has no thoughts of suicide, The patient has a job and obligations that need to be kept, The patient has family that depends upon and needs patient      Counseling / Coordination of Care:    Total floor / unit time spent today 30 minutes. Greater than 50% of total time was spent with the patient and / or family counseling and / or coordination of care. A description of the counseling / coordination of care: Direct Patient Care, Chart Review, and Documentation.

## 2024-01-26 NOTE — ED NOTES
From this time on all pt safety checks will be switched to paper charting. ED Behavioral Health Observation Record can be found in pt charts.      Shannan Sargent RN  01/25/24 1382

## 2024-01-26 NOTE — QUICK NOTE
"Patient has been medically screened and is not requiring any further acute medical intervention at this time, and is stable for subsequent transferral a facility with inpatient behavioral health services.      I have been asked by the receiving facility to use this specific phrase: \"Patient medically cleared for inpatient psychiatric admission.\"    "

## 2024-01-26 NOTE — ED NOTES
Patient signed 201. Rights and bed search were explained to pt who expressed understanding.     201 faxed to [facility].

## 2024-01-26 NOTE — NURSING NOTE
Patient admitted from [facility] ED approx. 1515, ambulated onto unit. Pt agreeable to shower and skin check with 2 nurses present, no skin issue noted. Pt affect very flat but cooperative and pleasant for admission process. Reporting increase in anxiety and insomnia since neighbor who was in his early 60s and a  passed away recently. Also reporting he suffers from gastroparesis and that causes him some increased anxiety r/t neighbors death. Over past several months reports losing interest in being out with friends and family, cannot go to work r/t overwhelming 'out of body' experiences and extreme euphoria at times. Also worries from lack of sleep he will get into car accident so does not want to leave the house. No current appetite but will eat when he feels his body absolutely needs nutrition but it is forced. When questioned about childhood trauma he was hesitant to answer this nurse but admits this attributes to some of his stressors and anxiety today. Reportedly tired of holding all of lifes stressors in and knows he is in the right place. Pt then oriented to milieu and room, noted watching television in day room with peers after assessment completed.

## 2024-01-26 NOTE — PLAN OF CARE
Problem: Depression - IP adult  Goal: Effects of depression will be minimized  Description: INTERVENTIONS:  - Assess impact of patient's symptoms on level of functioning, self-care needs and offer support as indicated  - Assess patient/family knowledge of depression, impact on illness and need for teaching  - Provide emotional support, presence and reassurance  - Assess for possible suicidal thoughts, ideation or self-harm. If patient expresses suicidal thoughts or statements do not leave alone, notify physician/AP immediately, initiate Suicide Precautions, and determine need for continual observation.  - Initiate consults and referrals as appropriate (a mental health professional, Spiritual [facility])  - Administer medication as ordered  Outcome: Progressing

## 2024-01-26 NOTE — ED NOTES
Was informed by  , Marisa CAMEJO, that this patient has been accepted to [facility] adult unit, but the physician is unable to put in orders for the admission. Was told that this problem has been passed along to morning shift, and they will be able to resolve the situation and get the admission orders at that time.     [facility]trip appears to be experiencing a problem of some sort. The deinsha does not load, pages remain blank. Since the patient is unable to be transported to the unit until morning after the orders are entered correctly, transport will need to be set up then.

## 2024-01-26 NOTE — ED NOTES
Patient is accepted at [facility]  Patient is accepted by [facility]     Transportation is arranged with [facility]  Transportation is scheduled for 2pm    Nurse report is to be called to [phone]   prior to patient transfer.

## 2024-01-27 PROBLEM — F32.2 CURRENT SEVERE EPISODE OF MAJOR DEPRESSIVE DISORDER WITHOUT PSYCHOTIC FEATURES WITHOUT PRIOR EPISODE (HCC): Status: ACTIVE | Noted: 2024-01-27

## 2024-01-27 PROBLEM — F41.9 ANXIETY: Status: ACTIVE | Noted: 2024-01-27

## 2024-01-27 LAB
25(OH)D3 SERPL-MCNC: 17.4 NG/ML (ref 30–100)
CHOLEST SERPL-MCNC: 195 MG/DL
EST. AVERAGE GLUCOSE BLD GHB EST-MCNC: 117 MG/DL
FOLATE SERPL-MCNC: 9 NG/ML
HBA1C MFR BLD: 5.7 %
HDLC SERPL-MCNC: 57 MG/DL
LDLC SERPL CALC-MCNC: 104 MG/DL (ref 0–100)
NONHDLC SERPL-MCNC: 138 MG/DL
TRIGL SERPL-MCNC: 170 MG/DL
VIT B12 SERPL-MCNC: 216 PG/ML (ref 180–914)

## 2024-01-27 PROCEDURE — 99223 1ST HOSP IP/OBS HIGH 75: CPT | Performed by: STUDENT IN AN ORGANIZED HEALTH CARE EDUCATION/TRAINING PROGRAM

## 2024-01-27 PROCEDURE — 82607 VITAMIN B-12: CPT

## 2024-01-27 PROCEDURE — GZHZZZZ GROUP PSYCHOTHERAPY: ICD-10-PCS | Performed by: PSYCHIATRY & NEUROLOGY

## 2024-01-27 PROCEDURE — 83036 HEMOGLOBIN GLYCOSYLATED A1C: CPT

## 2024-01-27 PROCEDURE — 93005 ELECTROCARDIOGRAM TRACING: CPT

## 2024-01-27 PROCEDURE — 82306 VITAMIN D 25 HYDROXY: CPT

## 2024-01-27 PROCEDURE — 82746 ASSAY OF FOLIC ACID SERUM: CPT

## 2024-01-27 PROCEDURE — GZ58ZZZ INDIVIDUAL PSYCHOTHERAPY, COGNITIVE-BEHAVIORAL: ICD-10-PCS | Performed by: PSYCHIATRY & NEUROLOGY

## 2024-01-27 RX ORDER — FLUOXETINE HYDROCHLORIDE 20 MG/1
20 CAPSULE ORAL DAILY
Status: DISCONTINUED | OUTPATIENT
Start: 2024-01-27 | End: 2024-01-30

## 2024-01-27 RX ORDER — NICOTINE 21 MG/24HR
21 PATCH, TRANSDERMAL 24 HOURS TRANSDERMAL DAILY
Status: DISCONTINUED | OUTPATIENT
Start: 2024-01-27 | End: 2024-01-27

## 2024-01-27 RX ADMIN — FLUOXETINE 20 MG: 20 CAPSULE ORAL at 11:56
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
## 2024-01-27 NOTE — NURSING NOTE
Pt was withdrawn to room this evening. Pt endorsed high anxiety and stated he didn't want to be around people because of it and due to his lack of sleep. Pt was pleasant and cooperative. Denied AH.VH. SI/HI. Pt administered 100mg trazodone @ 2133, upon reassessment @ 2230 trazodone seemingly effective. Pt appeared to be asleep. Will CTM. Continous pt safety checks ongoing.

## 2024-01-27 NOTE — NURSING NOTE
Patient appears to have slept thru the night, No acute behaviors observed or concerns voiced. Will CTM. Continuous patient safety checks in progress.

## 2024-01-27 NOTE — PLAN OF CARE
Problem: INFECTION - ADULT  Goal: Absence or prevention of progression during hospitalization  Description: INTERVENTIONS:  - Assess and monitor for signs and symptoms of infection  - Monitor lab/diagnostic results  - Monitor all insertion sites, i.e. indwelling lines, tubes, and drains  - Monitor endotracheal if appropriate and nasal secretions for changes in amount and color  - [place] appropriate cooling/warming therapies per order  - Administer medications as ordered  - Instruct and encourage patient and family to use good hand hygiene technique  - Identify and instruct in appropriate isolation precautions for identified infection/condition  Outcome: Progressing

## 2024-01-27 NOTE — H&P
Mary Carmen Moulton#  :1990 M  MRN:[id number]    [facility]:[id number]  Adm Date: 2024 1517  3:17 PM   ATT PHY: Ramiro Mejia Md  [facility]         Chief Complaint: Worsening MDD and anxiety.      History of Presenting Illness: Neville Moulton is a(n) 34 y.o. year old male who is admitted to [facility] on voluntary commitment basis.  Patient originally presented to [facility] Emergency Department on 24 for worsening depression and anxiety.     Patient examined at bedside.  Patient states he has a mild headache. Patient denies any other acute symptoms at this time.     No Known Allergies    No current facility-administered medications on file prior to encounter.     Current Outpatient Medications on File Prior to Encounter   Medication Sig Dispense Refill    albuterol (PROVENTIL HFA,VENTOLIN HFA) 90 mcg/act inhaler Inhale 2 puffs every 6 (six) hours as needed for wheezing (Patient not taking: Reported on 2024)      cholecalciferol (VITAMIN D3) 1,000 units tablet Take 1,000 Units by mouth daily (Patient not taking: Reported on 2024)      dicyclomine (BENTYL) 20 mg tablet Take 1 tablet (20 mg total) by mouth 2 (two) times a day as needed (abd pain) for up to 3 days 4 tablet 0    fluticasone (FLONASE) 50 mcg/act nasal spray 1 spray into each nostril daily (Patient not taking: Reported on 2024) 16 g 0    metoclopramide (REGLAN) 10 mg tablet Take 1 tablet (10 mg total) by mouth every 6 (six) hours (Patient not taking: Reported on 2022) 30 tablet 0    ondansetron (Zofran ODT) 4 mg disintegrating tablet Take 1 tablet (4 mg total) by mouth every 8 (eight) hours as needed for nausea or vomiting for up to 2 days 6 tablet 0    pantoprazole (PROTONIX) 40 mg tablet Take 40 mg by mouth daily (Patient not taking: Reported on 2024)         Active Ambulatory Problems     Diagnosis Date Noted    No Active  Ambulatory Problems     Resolved Ambulatory Problems     Diagnosis Date Noted    No Resolved Ambulatory Problems     Past Medical History:   Diagnosis Date    Asthma     Gastritis     Migraine        Past Surgical History:   Procedure Laterality Date    WRIST SURGERY         Social History:   Social History     Socioeconomic History    Marital status: Single     Spouse name: None    Number of children: None    Years of education: None    Highest education level: None   Occupational History    None   Tobacco Use    Smoking status: Some Days     Types: Cigars    Smokeless tobacco: Never   Vaping Use    Vaping status: Never Used   Substance and Sexual Activity    Alcohol use: Not Currently    Drug use: Yes     Comment: 'some THC in cigars'    Sexual activity: None   Other Topics Concern    None   Social History Narrative    None     Social Determinants of Health     Financial Resource Strain: Medium Risk (9/18/2022)    Received from [facility]    Overall Financial Resource Strain (CARDIA)     Difficulty of Paying Living Expenses: Somewhat hard   Food Insecurity: No Food Insecurity (9/18/2022)    Received from [facility]    Hunger Vital Sign     Worried About Running Out of Food in the Last Year: Never true     Ran Out of Food in the Last Year: Never true   Transportation Needs: No Transportation Needs (9/18/2022)    Received from [facility]    PRAPARE - Transportation     Lack of Transportation (Medical): No     Lack of Transportation (Non-Medical): No   Physical Activity: Not on file   Stress: Stress Concern Present (9/18/2022)    Received from [facility]    [place] [employer] - Occupational Stress Questionnaire     Feeling of Stress : Very much   Social Connections: Socially Isolated (9/18/2022)    Received from [facility]    Social Connection and Isolation Panel [NHANES]     Frequency of Communication with  "Friends and Family: Once a week     Frequency of Social Gatherings with Friends and Family: Never     Attends Temple Services: Never     Active Member of Clubs or Organizations: No     Attends Club or Organization Meetings: Never     Marital Status:    Intimate Partner Violence: Not At Risk (9/18/2022)    Received from [facility]    Humiliation, Afraid, Rape, and Kick questionnaire     Fear of Current or Ex-Partner: No     Emotionally Abused: No     Physically Abused: No     Sexually Abused: No   Housing Stability: Low Risk  (9/18/2022)    Received from [facility]    Housing Stability Vital Sign     Unable to Pay for Housing in the Last Year: No     Number of Places Lived in the Last Year: 1     Unstable Housing in the Last Year: No       Family History:   Family History   Family history unknown: Yes       Review of Systems   HENT:  Negative for sinus pressure.         Headache   Psychiatric/Behavioral:  The patient is nervous/anxious.    All other systems reviewed and are negative.      Physical Exam   Vitals: Blood pressure 133/82, pulse 58, temperature (!) 97.3 °F (36.3 °C), temperature source Temporal, resp. rate 18, height 5' 10\" (1.778 m), weight 97.2 kg (214 lb 3.2 oz), SpO2 99%.,Body mass index is 30.73 kg/m².  Constitutional: Awake, alert, in no acute distress.  Head: Normocephalic and atraumatic.   Mouth/Throat: Oropharynx is clear and moist.    Eyes: Conjunctivae and EOM are normal.   Neck: Neck supple. No thyromegaly present.   Cardiovascular: Normal rate, regular rhythm and normal heart sounds.    Pulmonary/Chest: Effort normal and breath sounds normal.   Abdominal: Soft. Bowel sounds are normal. There is no tenderness. There is no rebound and no guarding.   Neurological: No focal deficits.  Musculoskeletal:  Nontender spine.  Skin: Skin is warm and dry. No edema.     Assessment     Neville Moulton is a(n) 34 y.o. male with MDD and worsening anxiety.    Hx of " asthma. Albuterol as needed.   Migraines. Tylenol as needed.  Hx gastritis. Stable.  Tobacco abuse. Patient declines Nicotine patch at this time. Nicorette gum as needed.    Prognosis: Fair.    Discharge Plan: In progress.    Advanced Directives: I have discussed in detail with the patient the advanced directives. The patient has no appointed POA or living will at this time. Patient would like significant other Cherrie Dailey [phone] to be contacted in the case of an emergency. When discussing cardiac and pulmonary resuscitation efforts with the patient, the patient wishes to be FULL CODE.    I have spent more than 50 minutes gathering data, doing physical examination, and discussing the advanced directives, which was witnessed by caring staff.    The patient was discussed with Dr. Barroso and he is in agreement with the above note.
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
## 2024-01-28 PROCEDURE — 99232 SBSQ HOSP IP/OBS MODERATE 35: CPT | Performed by: STUDENT IN AN ORGANIZED HEALTH CARE EDUCATION/TRAINING PROGRAM

## 2024-01-28 RX ORDER — ERGOCALCIFEROL 1.25 MG/1
50000 CAPSULE ORAL WEEKLY
Status: DISCONTINUED | OUTPATIENT
Start: 2024-01-28 | End: 2024-02-01 | Stop reason: HOSPADM

## 2024-01-28 RX ORDER — MELATONIN
1000 DAILY
Status: DISCONTINUED | OUTPATIENT
Start: 2024-03-31 | End: 2024-02-01 | Stop reason: HOSPADM

## 2024-01-28 RX ADMIN — ACETAMINOPHEN 975 MG: 325 TABLET ORAL at 12:37

## 2024-01-28 RX ADMIN — CYANOCOBALAMIN TAB 500 MCG 500 MCG: 500 TAB at 16:23

## 2024-01-28 RX ADMIN — FLUOXETINE 20 MG: 20 CAPSULE ORAL at 08:34

## 2024-01-28 RX ADMIN — ERGOCALCIFEROL 50000 UNITS: 1.25 CAPSULE, LIQUID FILLED ORAL at 16:23

## 2024-01-28 NOTE — NURSING NOTE
Patient was visible in milieu, calm ,cooperative and compliant with medications and meals. Patient denies SI,HI but does endorse moderate anxiety. Staff maintained Q 7 safety checks, administered medications as prescribed,and monitored as needed. We will continue to monitor, support and encourage as needed.

## 2024-01-28 NOTE — NURSING NOTE
Pt was observed laying on bed with eyes closed, normal breathing pattern. Staff maintained Q 7 security checks.We will continue to monitor and assist as needed

## 2024-01-28 NOTE — NURSING NOTE
Pt requested medication for 5/10 headache pain. Tylenol 975 mg administered at 1237 per pt request.
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
## 2024-01-28 NOTE — PROGRESS NOTES
"Progress Note - Neville Moulton 34 y.o. male MRN: [id number]    Unit/Bed#: [facility] 258-01 Encounter: [id number]        Subjective:   Patient seen and examined at bedside after reviewing the chart and discussing the case with the caring staff.      Patient examined at bedside.  Patient has no acute issues.    On review of patient's labs it was found that patient's vitamin D level was low 17.4 and B12 level is also low 216.    Physical Exam   Vitals: Blood pressure 129/77, pulse 72, temperature 97.6 °F (36.4 °C), temperature source Temporal, resp. rate 17, height 5' 10\" (1.778 m), weight 97.2 kg (214 lb 3.2 oz), SpO2 99%.,Body mass index is 30.73 kg/m².  Constitutional: He appears well-developed.   HEENT: PERR, EOMI, MMM  Cardiovascular: Normal rate and regular rhythm.    Pulmonary/Chest: Effort normal and breath sounds normal.   Abdomen: Soft, + BS, NT    Assessment/Plan:  Neville Moulton is a(n) 34 y.o. male with MDD and worsening anxiety.     Hx of asthma. Albuterol as needed.   Migraines. Tylenol as needed.  Hx gastritis. Stable.  Tobacco abuse. Patient declines Nicotine patch at this time. Nicorette gum as needed.  New vitamin D deficiency.  I will put the patient on vitamin D bolus doses for 10 weeks followed by vitamin D3 1000 units daily.  New vitamin B12 deficiency.  I will put the patient on vitamin B12 supplements.  "

## 2024-01-28 NOTE — PLAN OF CARE
Problem: SAFETY ADULT  Goal: Maintain or return to baseline ADL function  Description: INTERVENTIONS:  -  Assess patient's ability to carry out ADLs; assess patient's baseline for ADL function and identify physical deficits which impact ability to perform ADLs (bathing, care of mouth/teeth, toileting, grooming, dressing, etc.)  - Assess/evaluate cause of self-care deficits   - Assess range of motion  - Assess patient's mobility; develop plan if impaired  - Assess patient's need for assistive devices and provide as appropriate  - Encourage maximum independence but intervene and supervise when necessary  - Involve family in performance of ADLs  - Assess for home care needs following discharge   - Consider OT consult to assist with ADL evaluation and planning for discharge  - Provide patient education as appropriate  Outcome: Progressing     Problem: INFECTION - ADULT  Goal: Absence of fever/infection during neutropenic period  Description: INTERVENTIONS:  - Monitor WBC    Outcome: Progressing     Problem: INFECTION - ADULT  Goal: Absence or prevention of progression during hospitalization  Description: INTERVENTIONS:  - Assess and monitor for signs and symptoms of infection  - Monitor lab/diagnostic results  - Monitor all insertion sites, i.e. indwelling lines, tubes, and drains  - Monitor endotracheal if appropriate and nasal secretions for changes in amount and color  - [place] appropriate cooling/warming therapies per order  - Administer medications as ordered  - Instruct and encourage patient and family to use good hand hygiene technique  - Identify and instruct in appropriate isolation precautions for identified infection/condition  Outcome: Progressing

## 2024-01-29 LAB
ATRIAL RATE: 68 BPM
P AXIS: 35 DEGREES
PR INTERVAL: 152 MS
QRS AXIS: 43 DEGREES
QRSD INTERVAL: 92 MS
QT INTERVAL: 410 MS
QTC INTERVAL: 435 MS
T WAVE AXIS: 38 DEGREES
VENTRICULAR RATE: 68 BPM

## 2024-01-29 PROCEDURE — 99232 SBSQ HOSP IP/OBS MODERATE 35: CPT

## 2024-01-29 RX ORDER — MIRTAZAPINE 15 MG/1
7.5 TABLET, FILM COATED ORAL
Status: DISCONTINUED | OUTPATIENT
Start: 2024-01-29 | End: 2024-01-31

## 2024-01-29 RX ADMIN — FLUOXETINE 20 MG: 20 CAPSULE ORAL at 09:25

## 2024-01-29 RX ADMIN — CYANOCOBALAMIN TAB 500 MCG 500 MCG: 500 TAB at 09:25

## 2024-01-29 RX ADMIN — MIRTAZAPINE 7.5 MG: 15 TABLET, FILM COATED ORAL at 22:07

## 2024-01-29 NOTE — PROGRESS NOTES
01/29/24 1651   Housing Stability   In the last 12 months, was there a time when you were not able to pay the mortgage or rent on time? N   In the last 12 months, how many places have you lived? 1   In the last 12 months, was there a time when you did not have a steady place to sleep or slept in a shelter (including now)? N   Transportation Needs   In the past 12 months, has lack of transportation kept you from medical appointments or from getting medications? no   In the past 12 months, has lack of transportation kept you from meetings, work, or from getting things needed for daily living? No   Food Insecurity   Within the past 12 months, you worried that your food would run out before you got the money to buy more. Never true   Within the past 12 months, the food you bought just didn't last and you didn't have money to get more. Never true   Intimate Partner Violence   Within the last year, have you been afraid of your partner or ex-partner? No   Within the last year, have you been humiliated or emotionally abused in other ways by your partner or ex-partner? No   Within the last year, have you been kicked, hit, slapped, or otherwise physically hurt by your partner or ex-partner? No   Within the last year, have you been raped or forced to have any kind of sexual activity by your partner or ex-partner? No   Utilities   In the past 12 months has the electric, gas, oil, or water company threatened to shut off services in your home? No

## 2024-01-29 NOTE — SOCIAL WORK
1/29/24     Team Meeting   Meeting Type Daily Rounds   Team Members Present   Team Members Present Physician;Nurse;Occupational Therapist;   Physician Team Member Dr. Mouna LINDSAY; Dr. Jean LINDSAY; Dr. Amber MARQUEZ ;    Nursing Team Member Velvet Carter RN   Social Work Team Member Nallely PASTRANA; JACOB Leon    OT Team Member Erin Holman; ATR-BC   Patient/Family Present   Patient Present No   Patient's Family Present No   New admit, 201, increased dep, poor concentration, endorses increased anx/dep, denies si/hi/avh, pleasant, cooperative, motivated to get well for daughter

## 2024-01-29 NOTE — NURSING NOTE
Patient was visible in milieu, calm and cooperative with staff and peers. Patient spent most of the shift watching TV in dinning room, and was compliant with medications and meals. Staff maintained Q 7 safety checks monitoring,administered medications as prescribed, and assisted as needed. We will continue to monitor ,support and encourage as needed.
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
## 2024-01-29 NOTE — NURSING NOTE
Patient slept for most of the night but appeared restless and uncomfortable, tossing and turning throughout. Q7 minute checks ongoing.

## 2024-01-29 NOTE — PROGRESS NOTES
"Progress Note - Neville Moulton 34 y.o. male MRN: [id number]    Unit/Bed#: [facility] 258-01 Encounter: [id number]        Subjective:   Patient seen and examined at bedside after reviewing the chart and discussing the case with the caring staff.      Patient examined at bedside.  Patient has no acute issues.    On review of patient's labs it was found that patient's vitamin D level was low 17.4 and B12 level is also low 216.    Physical Exam   Vitals: Blood pressure 143/76, pulse 74, temperature 98.7 °F (37.1 °C), temperature source Temporal, resp. rate 16, height 5' 10\" (1.778 m), weight 97.5 kg (215 lb), SpO2 97%.,Body mass index is 30.85 kg/m².  Constitutional: He appears well-developed.   HEENT: PERR, EOMI, MMM  Cardiovascular: Normal rate and regular rhythm.    Pulmonary/Chest: Effort normal and breath sounds normal.   Abdomen: Soft, + BS, NT    Assessment/Plan:  Neville Moulton is a(n) 34 y.o. male with MDD and worsening anxiety.     Hx of asthma. Albuterol as needed.   Migraines. Tylenol as needed.  Hx gastritis. Stable.  Tobacco abuse. Patient declines Nicotine patch at this time. Nicorette gum as needed.  New vitamin D deficiency.  I will put the patient on vitamin D bolus doses for 10 weeks followed by vitamin D3 1000 units daily.  New vitamin B12 deficiency.  I will put the patient on vitamin B12 supplements.  "

## 2024-01-29 NOTE — PROGRESS NOTES
01/29/24 1300   Activity/Group Checklist   Group   (Self Assessment)   Attendance Attended   Attendance Duration (min) 16-30   Interactions Interacted appropriately   Affect/Mood Appropriate;Calm   Goals Achieved Identified feelings;Identified triggers;Discussed coping strategies;Identified resources and support systems;Able to listen to others;Able to engage in interactions;Able to reflect/comment on own behavior;Able to manage/cope with feelings;Able to self-disclose;Able to recieve feedback     AT met with pt to complete self assessment. PT displayed a bright mood and affect, was pleasant and cooperative and easily answered assessment questions. Pt reported that his biggest stressor leading to this admission is his past memories and he is struggling with focusing. What he likes most about life is his daughter and what he likes least about his life is not being able to sleep. PT completed high school and is currently working at [employer]. PT enjoys reading, music and singing and would like to learn more about relaxation techniques and knowledge about his illness and medications while he is here in the hospital.

## 2024-01-29 NOTE — NURSING NOTE
Patient presents alert, cooperative and visible throughout the community. He brightens upon approach. Patient denies SI/HI/SIB and A/VH at this time. Pt reports feeling less depressed, yet still no energy. VS WNL. Patient c/o anxiety. Patient able to utilize coping skills to cope with anxiety. Pt is amongst his peers socializing appropriate, he remains respectful towards staff. Consumed 100% of breakfast and lunch. Patient is compliant with medication administration without prompting. Maintained on safe precautions without incident. Staff provides therapeutic support, positive encouragement, and a structured routine. Patient will continue working on treatment goals. Will continue to monitor progress.

## 2024-01-29 NOTE — NURSING NOTE
Patient is compliant with meds and meals. Patient is pleasant and cooperative, social and visible. Patient states still have depression but everything else. Q 7 min behavioral and safety checks in place.

## 2024-01-29 NOTE — NUTRITION
01/29/24 1525   Biochemical Data,Medical Tests, and Procedures   Biochemical Data/Medical Tests/Procedures Lab values reviewed;Meds reviewed   Labs (Comment) 1/25 CMP WNL, TRIG;170, LDL:104, CBC WNL   Meds (Comment) Vit D, Vit B12, prozac, atarax, ativan, remeron, zyprexa   Nutrition-Focused Physical Exam   Nutrition-Focused Physical Exam Findings RN skin assessment reviewed;No skin issues documented   Nutrition-Focused Physical Exam Findings Cigar smoker.   Medical-Related Concerns asthma, gastritis, migraine   Adequacy of Intake   Nutrition Modality PO   Feeding Route   PO Independent   Current PO Intake   Current Diet Order Regular diet thin liquids   Current Meal Intake %   Estimated calorie intake compared to estimated need Nutrient needs met.   PES Statement   Problem No nutrition diagnosis   Recommendations/Interventions   Malnutrition/BMI Present No  (does not meet criteria)   Summary Unsure weight change, Unsure amount, poor PO; MST-5. Regular diet thin liquids. Meal completions 100%. Reports he avoids pork and greasy foods due to history of GI issues. States his appetite is pretty good. Reports he eats like a little kid. Consumes 3 smaller portion meals per day. 1/28/#; 1/26/#; 6/15/#; 2/3/#. No significant weight changes. Skin intact.   Interventions/Recommendations Continue current diet order   Education Assessment   Education Education not indicated at this time   Nutrition Complexity Risk   Nutrition complexity level Low risk   Follow up date 02/12/24

## 2024-01-29 NOTE — PROGRESS NOTES
"Progress Note - Behavioral Health   Neville Moulton 34 y.o. male MRN: [id number]  Unit/Bed#: [facility] 258-01 Encounter: [id number]    Patient was seen today for continuation of care, records reviewed and patient was discussed with the morning case review team.     Neville was seen today for psychiatric follow-up.  On assessment today, Neville was was seen in this writers office. Today he reports his mood as \"okay.\" He feels he feels calmer on his medication and is less irritable. He is guarded at times, preferring to talk about his daughter and his job than stressors or feelings. He denies side effects from his medications.  His appetite is good.  He struggles with sleep, getting about 3 to 4 hours per night.  He states prior to admission he has gone days without adequate sleep, worsening his mood and making it difficult to concentrate.  Patient states he has trialed trazodone and melatonin in the past without success. Will discontinue trazodone and start Remeron as needed for sleep.    Neville denies acute suicidal/self-harm ideation/intent/plan upon direct inquiry at this time.  Neville remains behaviorally appropriate, no agitation or aggression noted on exam or in report.  Neville also denies HI/AH/VH, and does not appear overtly manic.  No overt delusions or paranoia are verbalized.  Neville remains adherent to his current psychotropic medication regimen and denies any side effects from medications, as well as none noted on exam.    Recommended Treatment: Treatment plan and medication changes discussed and per the attending physician the plan is:    1.Continue with group therapy, milieu therapy and occupational therapy  2.Behavioral Health checks every 7 minutes  3.Continue frequent safety checks and vitals per unit protocol  4.Continue with [facility] medical management as indicated  5.Discontinue trazodone PRN as patient reports has not been effective here or previous to hospitalization. Start Remeron 7.5mg PO at HS " prn insomnia. Continue all other medications at this time.  6.Will review labs in the a.m.  7.Disposition Planning: Discharge planning and efforts remain ongoing    Vitals:  Vitals:    01/29/24 0743   BP: 143/76   Pulse: 74   Resp: 16   Temp: 98.7 °F (37.1 °C)   SpO2: 97%       Laboratory Results:  I have personally reviewed all pertinent laboratory/tests results.    Psychiatric Review of Systems:  Behavior over the last 24 hours:  unchanged.   Sleep: Broken  Appetite: Good  Medication side effects:   ROS: headache, denies shortness of breath or chest pain and all other systems are negative for acute changes    Mental Status Evaluation:    Appearance:  age appropriate, casually dressed   Behavior:  pleasant, cooperative   Speech:  normal rate and volume   Mood:  anxious, slightly less irritable   Affect:  constricted   Thought Process:  organized, logical, goal directed   Associations: intact associations   Thought Content:  normal   Perceptual Disturbances: none   Risk Potential: Suicidal ideation - None  Homicidal ideation - None  Potential for aggression - No   Sensorium:  oriented to person, place, time/date, and situation   Memory:  recent and remote memory grossly intact   Consciousness:  alert and awake   Attention/Concentration: attention span and concentration are age appropriate   Insight:  age appropriate   Judgment: good   Gait/Station: normal gait/station   Motor Activity: no abnormal movements     Progress Toward Goals:   Neville is progressing towards goals of inpatient psychiatric treatment by continued medication compliance and is attending therapeutic modalities on the milieu. However, the patient continues to require inpatient psychiatric hospitalization for continued medication management and titration to optimize symptom reduction, improve sleep hygiene, and demonstrate adequate self-care.    Risk of Harm to Self:   Nursing Suicide Risk Assessment Last 24 hours: C-SSRS Risk (Since Last  Contact)  Calculated C-SSRS Risk Score (Since Last Contact): No Risk Indicated    Risk of Harm to Others:  Nursing Homicide Risk Assessment: Violence Risk to Others: Denies within past 6 months    The following interventions are recommended: behavioral checks every 7 minutes, continued hospitalization on locked unit      Assessment/Plan   Principal Problem:    Current severe episode of major depressive disorder without psychotic features without prior episode (HCC)  Active Problems:    Anxiety        Behavioral Health Medications: all current active meds have been reviewed.  Current Facility-Administered Medications   Medication Dose Route Frequency Provider Last Rate    acetaminophen  650 mg Oral Q6H PRN Brooke M Medei, CRNP      acetaminophen  650 mg Oral Q4H PRN Brooke M Medei, CRNP      acetaminophen  975 mg Oral Q6H PRN Brooke M Medei, CRNP      albuterol  2 puff Inhalation Q6H PRN Christine Lopez PA-C      aluminum-magnesium hydroxide-simethicone  30 mL Oral Q4H PRN Brooke M Medei, CRNP      [START ON 3/31/2024] cholecalciferol  1,000 Units Oral Daily Anil Barroso MD      cyanocobalamin  500 mcg Oral Daily Anil Barroso MD      hydrOXYzine HCL  50 mg Oral Q6H PRN Max 4/day Brooke M Medei, CRNP      Or    diphenhydrAMINE  50 mg Intramuscular Q6H PRN Brooke M Medei, CRNP      ergocalciferol  50,000 Units Oral Weekly Anil Barroso MD      FLUoxetine  20 mg Oral Daily Mario Sidhu DO      hydrOXYzine HCL  100 mg Oral Q6H PRN Max 4/day Brooke M Medei, CRNP      Or    LORazepam  2 mg Intramuscular Q6H PRN Brooke M Medei, CRNP      hydrOXYzine HCL  25 mg Oral Q6H PRN Max 4/day Brooke M Medei, CRNP      nicotine polacrilex  4 mg Oral Q2H PRN Angy Higgins PA-C      OLANZapine  5 mg Oral Q4H PRN Max 3/day Brooke M Medei, CRNP      Or    OLANZapine  2.5 mg Intramuscular Q4H PRN Max 3/day Brooke M Medei, CRNP      OLANZapine  5 mg Oral Q3H PRN Max 3/day Brooke M Medei, CRNP      Or    OLANZapine  5 mg  Intramuscular Q3H PRN Max 3/day Brooke M Medei, DOMONIQUENP      OLANZapine  2.5 mg Oral Q4H PRN Max 6/day Brooke M Medei, CRNP      polyethylene glycol  17 g Oral Daily PRN Brooke M Medei, DOMONIQUENP      traZODone  100 mg Oral HS PRN Brooke M Medei, DOMONIQUENP         Risks / Benefits of Treatment:  Risks, benefits, and possible side effects of medications explained to patient and patient verbalizes understanding and agreement for treatment.    Counseling / Coordination of Care:  Patient's progress reviewed with nursing staff.  Medications, treatment progress and treatment plan reviewed with patient.  Supportive counseling provided to the patient.    Total floor/unit time spent today 25 minutes. Greater than 50% of total time was spent with the patient and / or family counseling and / or coordination of care. A description of the counseling / coordination of care: medication education, treatment plan, supportive therapy.

## 2024-01-30 ENCOUNTER — TELEPHONE (OUTPATIENT)
Dept: PSYCHIATRY | Facility: CLINIC | Age: 34
End: 2024-01-30

## 2024-01-30 PROCEDURE — 99232 SBSQ HOSP IP/OBS MODERATE 35: CPT | Performed by: NURSE PRACTITIONER

## 2024-01-30 RX ORDER — FLUOXETINE HYDROCHLORIDE 20 MG/1
20 CAPSULE ORAL ONCE
Status: COMPLETED | OUTPATIENT
Start: 2024-01-30 | End: 2024-01-30

## 2024-01-30 RX ORDER — FLUOXETINE HYDROCHLORIDE 20 MG/1
40 CAPSULE ORAL DAILY
Status: DISCONTINUED | OUTPATIENT
Start: 2024-01-31 | End: 2024-02-01 | Stop reason: HOSPADM

## 2024-01-30 RX ORDER — ALBUTEROL SULFATE 90 UG/1
2 AEROSOL, METERED RESPIRATORY (INHALATION) EVERY 6 HOURS PRN
Qty: 18 G | Refills: 0 | Status: SHIPPED | OUTPATIENT
Start: 2024-01-30

## 2024-01-30 RX ORDER — MELATONIN
1000 DAILY
Qty: 30 TABLET | Refills: 0 | Status: SHIPPED | OUTPATIENT
Start: 2024-01-30

## 2024-01-30 RX ORDER — ERGOCALCIFEROL 1.25 MG/1
50000 CAPSULE ORAL WEEKLY
Qty: 8 CAPSULE | Refills: 0 | Status: SHIPPED | OUTPATIENT
Start: 2024-02-04 | End: 2024-04-01

## 2024-01-30 RX ADMIN — MIRTAZAPINE 7.5 MG: 15 TABLET, FILM COATED ORAL at 21:55

## 2024-01-30 RX ADMIN — FLUOXETINE 20 MG: 20 CAPSULE ORAL at 12:09

## 2024-01-30 RX ADMIN — FLUOXETINE 20 MG: 20 CAPSULE ORAL at 09:29

## 2024-01-30 RX ADMIN — CYANOCOBALAMIN TAB 500 MCG 500 MCG: 500 TAB at 09:29

## 2024-01-30 NOTE — NURSING NOTE
Patient visible in dayroom, social with staff and peers. Bright on approach. Pleasant and cooperative.  Denies SI,HI,AVH and anxiety but does endorse depression. Safety checks continue Q 7 minutes.

## 2024-01-30 NOTE — NURSING NOTE
Patient is compliant with meds and meals. Patient is pleasant and cooperative, social and visible. Patient denies everything brightens during assessment. Q 7 min behavioral and safety checks in place.

## 2024-01-30 NOTE — PLAN OF CARE
Problem: PAIN - ADULT  Goal: Verbalizes/displays adequate comfort level or baseline comfort level  Description: Interventions:  - Encourage patient to monitor pain and request assistance  - Assess pain using appropriate pain scale  - Administer analgesics based on type and severity of pain and evaluate response  - Implement non-pharmacological measures as appropriate and evaluate response  - Consider cultural and social influences on pain and pain management  - Notify physician/advanced practitioner if interventions unsuccessful or patient reports new pain  Outcome: Progressing     Problem: COPING  Goal: Pt/Family able to verbalize concerns and demonstrate effective coping strategies  Description: INTERVENTIONS:  - Assist patient/family to identify coping skills, available support systems and cultural and spiritual values  - Provide emotional support, including active listening and acknowledgement of concerns of patient and caregivers  - Reduce environmental stimuli, as able  - Provide patient education  - Assess for spiritual pain/suffering and initiate spiritual care, including notification of Pastoral Care or arthur based community as needed  - Assess effectiveness of coping strategies  Outcome: Progressing  Goal: Will report anxiety at manageable levels  Description: INTERVENTIONS:  - Administer medication as ordered  - Teach and encourage coping skills  - Provide emotional support  - Assess patient/family for anxiety and ability to cope  Outcome: Progressing

## 2024-01-30 NOTE — PROGRESS NOTES
Progress Note - Behavioral Health   Neville Moulton 34 y.o. male MRN: [id number]  Unit/Bed#: [facility] 258-01 Encounter: [id number]    Assessment/Plan   Principal Problem:    Current severe episode of major depressive disorder without psychotic features without prior episode (HCC)  Active Problems:    Anxiety      Behavior over the last 24 hours:  unchanged  Sleep: Intermittent  Appetite: normal  Medication side effects: Yes, next-day lethargy after PRN remeron  ROS: no complaints and all other systems are negative    Neville continues to endorse ongoing depression and mild anxiety.  Rates depression 3/10 and anxiety 1/10.  Appears to be minimizing depressive symptoms.  Presents with blunted affect and reports having difficulty maintaining sleep.  Further described sleep difficulty taking place over the past 2 weeks.  Denies SI and identifies protective factors against suicide which include his daughter and wife.  Endorses anhedonia prior to admission but states he is looking forward to spending time with his daughter upon discharge.  Appears neatly groomed.  Intermittently visible in the milieu, but isolative to self.  Encouraged to attend milieu activities and keep in contact with his wife to assess progress.  Denies any history of manic symptoms and agreeable to further titration of Prozac for treatment of depression.  Thoughts were linear and organized with no delusional content verbalized.    Mental Status Evaluation:  Appearance:  age appropriate, piercings, and neatly groomed, tattoos   Behavior:  Cooperative, isolative to self, intermittent eye contact   Speech:  soft, scant   Mood:  depressed   Affect:  blunted, mood-congruent, and redirectable   Thought Process:  Linear   Associations: intact associations   Thought Content:  No overt delusions or paranoia verbalized   Perceptual Disturbances: Denies AVH, did not appear internally preoccupied   Risk Potential: Suicidal Ideations none  Homicidal Ideations  none  Potential for Aggression No   Sensorium:  person, place, time/date, and situation   Memory:  recent and remote memory grossly intact   Consciousness:  alert and awake    Attention: attention span and concentration were age appropriate   Insight:  limited   Judgment: limited   Gait/Station: normal gait/station and normal balance   Motor Activity: no abnormal movements     Progress Toward Goals: Slowly improving.  Reports improving depression, but appears to be minimizing depressive symptoms.  Isolative to self and exhibits intermittent eye contact.  Maintaining safety on unit.  Sleep intermittent.  Plan is to titrate Prozac 40 mg PO QD for treatment of depression and anxiety.  Encouraged to continue using PRN remeron for sleep difficulty.  No discharge date at this time.    Recommended Treatment: Continue with group therapy, milieu therapy and occupational therapy.      Risks, benefits and possible side effects of Medications:   Neville has limited understanding of risk versus benefits of medications, but agrees to take as prescribed.    Medications:   Increase Prozac 40 mg PO QD  all current active meds have been reviewed and current meds:   Current Facility-Administered Medications   Medication Dose Route Frequency    acetaminophen (TYLENOL) tablet 650 mg  650 mg Oral Q6H PRN    acetaminophen (TYLENOL) tablet 650 mg  650 mg Oral Q4H PRN    acetaminophen (TYLENOL) tablet 975 mg  975 mg Oral Q6H PRN    albuterol (PROVENTIL HFA,VENTOLIN HFA) inhaler 2 puff  2 puff Inhalation Q6H PRN    aluminum-magnesium hydroxide-simethicone (MAALOX) oral suspension 30 mL  30 mL Oral Q4H PRN    [START ON 3/31/2024] cholecalciferol (VITAMIN D3) tablet 1,000 Units  1,000 Units Oral Daily    cyanocobalamin (VITAMIN B-12) tablet 500 mcg  500 mcg Oral Daily    hydrOXYzine HCL (ATARAX) tablet 50 mg  50 mg Oral Q6H PRN Max 4/day    Or    diphenhydrAMINE (BENADRYL) injection 50 mg  50 mg Intramuscular Q6H PRN    ergocalciferol  (VITAMIN D2) capsule 50,000 Units  50,000 Units Oral Weekly    FLUoxetine (PROzac) capsule 20 mg  20 mg Oral Once    [START ON 1/31/2024] FLUoxetine (PROzac) capsule 40 mg  40 mg Oral Daily    hydrOXYzine HCL (ATARAX) tablet 100 mg  100 mg Oral Q6H PRN Max 4/day    Or    LORazepam (ATIVAN) injection 2 mg  2 mg Intramuscular Q6H PRN    hydrOXYzine HCL (ATARAX) tablet 25 mg  25 mg Oral Q6H PRN Max 4/day    mirtazapine (REMERON) tablet 7.5 mg  7.5 mg Oral HS PRN    nicotine polacrilex (NICORETTE) gum 4 mg  4 mg Oral Q2H PRN    OLANZapine (ZyPREXA) tablet 5 mg  5 mg Oral Q4H PRN Max 3/day    Or    OLANZapine (ZyPREXA) IM injection 2.5 mg  2.5 mg Intramuscular Q4H PRN Max 3/day    OLANZapine (ZyPREXA) tablet 5 mg  5 mg Oral Q3H PRN Max 3/day    Or    OLANZapine (ZyPREXA) IM injection 5 mg  5 mg Intramuscular Q3H PRN Max 3/day    OLANZapine (ZyPREXA) tablet 2.5 mg  2.5 mg Oral Q4H PRN Max 6/day    polyethylene glycol (MIRALAX) packet 17 g  17 g Oral Daily PRN   .    Labs: I have personally reviewed all pertinent laboratory/tests results. CBC:   Lab Results   Component Value Date    WBC 7.76 01/25/2024    RBC 5.20 01/25/2024    HGB 15.9 01/25/2024    HCT 47.0 01/25/2024    MCV 90 01/25/2024     01/25/2024    MCH 30.6 01/25/2024    MCHC 33.8 01/25/2024    RDW 12.1 01/25/2024    MPV 10.1 01/25/2024    NEUTROABS 3.55 01/25/2024     CMP:   Lab Results   Component Value Date    SODIUM 136 01/25/2024    K 4.0 01/25/2024     01/25/2024    CO2 29 01/25/2024    AGAP 5 01/25/2024    BUN 17 01/25/2024    CREATININE 0.80 01/25/2024    GLUC 93 01/25/2024    CALCIUM 9.5 01/25/2024    AST 14 01/25/2024    ALT 16 01/25/2024    ALKPHOS 48 01/25/2024    TP 7.6 01/25/2024    ALB 4.6 01/25/2024    TBILI 0.52 01/25/2024    EGFR 115 01/25/2024     Drug Screen:   Lab Results   Component Value Date    AMPMETHUR Negative 01/25/2024    BARBTUR Negative 01/25/2024    BDZUR Negative 01/25/2024    THCUR Positive (A) 01/25/2024     COCAINEUR Negative 01/25/2024    METHADONEUR Negative 01/25/2024    OPIATEUR Negative 01/25/2024    PCPUR Negative 01/25/2024     EKG   Lab Results   Component Value Date    VENTRATE 68 01/27/2024    ATRIALRATE 68 01/27/2024    PRINT 152 01/27/2024    QRSDINT 92 01/27/2024    QTINT 410 01/27/2024    QTCINT 435 01/27/2024    PAXIS 35 01/27/2024    QRSAXIS 43 01/27/2024    TWAVEAXIS 38 01/27/2024       Counseling / Coordination of Care  Total floor / unit time spent today 30 minutes. Greater than 50% of total time was spent with the patient and / or family counseling and / or coordination of care. A description of the counseling / coordination of care: Medication education, treatment plan, safety planning

## 2024-01-30 NOTE — NURSING NOTE
Patient observed sleeping during 7 minute checks. No distress noted. Non labored breathing. Safety checks continue.

## 2024-01-30 NOTE — PROGRESS NOTES
01/30/24 1106   Team Meeting   Meeting Type Tx Team Meeting   Team Members Present   Team Members Present Physician;Nurse;   Physician Team Member Dr Mouna MD   Nursing Team Member Velvet Carter RN   Social Work Team Member JACOB Leon   Patient/Family Present   Patient Present Yes   Patient's Family Present No   CM reviewed tx plan and goals with tx team. All in agreement and signed.

## 2024-01-30 NOTE — SOCIAL WORK
MARLIN placed call to pt spouse Cherrie Dailey [phone]. CM introduced self and requested a return call to review tx plan and goals.    MARLIN sent [facility] to  psych intake to refer pt for med management and talk therapy.      Pt declined contact with pcp stating that upon d/c he plans to contact his insurance to switch providers.

## 2024-01-30 NOTE — PROGRESS NOTES
"Progress Note - Neville Moulton 34 y.o. male MRN: [id number]    Unit/Bed#: [facility] 258-01 Encounter: [id number]        Subjective:   Patient seen and examined at bedside after reviewing the chart and discussing the case with the caring staff.      Patient examined at bedside.  Patient has no acute issues.    Patient is being discharged on Thursday, 2/1/2024.  Patient is requesting his prescriptions.  I reviewed and reconciled patient's problem list and medications.    Physical Exam   Vitals: Blood pressure 110/66, pulse 72, temperature 98.8 °F (37.1 °C), temperature source Temporal, resp. rate 16, height 5' 10\" (1.778 m), weight 97.5 kg (215 lb), SpO2 98%.,Body mass index is 30.85 kg/m².  Constitutional: He appears well-developed.   HEENT: PERR, EOMI, MMM  Cardiovascular: Normal rate and regular rhythm.    Pulmonary/Chest: Effort normal and breath sounds normal.   Abdomen: Soft, + BS, NT    Assessment/Plan:  Neville Moulton is a(n) 34 y.o. male with MDD and worsening anxiety.    Medical clearance.  Patient is medically cleared for discharge.  All scripts will be sent out for the patient.     Hx of asthma. Albuterol as needed.   Migraines. Tylenol as needed.  Hx gastritis. Stable.  Tobacco abuse. Patient declines Nicotine patch at this time. Nicorette gum as needed.  New vitamin D deficiency.  I will put the patient on vitamin D bolus doses for 10 weeks followed by vitamin D3 1000 units daily.  New vitamin B12 deficiency.  I will put the patient on vitamin B12 supplements.  "

## 2024-01-30 NOTE — TELEPHONE ENCOUNTER
Patient is scheduled for dc and is scheduled to see elizabeth macedo on 2/19 at 2pm and cristina sánchez on 2/8 at 9am

## 2024-01-30 NOTE — SOCIAL WORK
1/30/2024     Team Meeting   Meeting Type Daily Rounds   Team Members Present   Team Members Present Physician;Nurse;Occupational Therapist;   Physician Team Member Dr. Mouna LINDSAY; Dr. Jean LINDSAY; Dr. Amber MARQUEZ ; EDUARDO Mathew   Nursing Team Member Velvet Carter RN   Social Work Team Member Nallely PASTRANA; JACOB Leon    OT Team Member Erin Holman; ATR-BC   Patient/Family Present   Patient Present No   Patient's Family Present No   Pt return home with HARRY Garcia f/u, calm, cooperative, med complaint, social, pleasant, visible on unit, declined PHP    Yes

## 2024-01-30 NOTE — SOCIAL WORK
CM received a return call from pt spouse Cherrie [phone]. CM reviewed tx plan and goals. Cherrie verbalized understanding and in agreement.  CM discussed the firearms in the home and Cherrie stated that pt has never expressed the desire to kill himself, therefore, she does not feel having them in the home is an issue. Cherrie confirmed that they collect them for target practice and that they are locked and unloaded.  CM educated Cherrie on removing them or denying access until pt is established in his outpatient tx after d/c. CM encouraged her to deny access at any time should she feel pt's mental health regresses. Cherrie verbalized understanding and in agreement.  CM provided contact info for future collaboration and agreed to maintain contact during pt tx stay.

## 2024-01-31 PROBLEM — G47.9 DIFFICULTY SLEEPING: Chronic | Status: ACTIVE | Noted: 2024-01-31

## 2024-01-31 PROCEDURE — 99232 SBSQ HOSP IP/OBS MODERATE 35: CPT | Performed by: NURSE PRACTITIONER

## 2024-01-31 RX ORDER — MIRTAZAPINE 7.5 MG/1
7.5 TABLET, FILM COATED ORAL
Qty: 30 TABLET | Refills: 0 | Status: SHIPPED | OUTPATIENT
Start: 2024-01-31 | End: 2024-03-01

## 2024-01-31 RX ORDER — FLUOXETINE HYDROCHLORIDE 40 MG/1
40 CAPSULE ORAL DAILY
Qty: 30 CAPSULE | Refills: 0 | Status: SHIPPED | OUTPATIENT
Start: 2024-02-01 | End: 2024-03-02

## 2024-01-31 RX ORDER — MIRTAZAPINE 15 MG/1
7.5 TABLET, FILM COATED ORAL
Status: DISCONTINUED | OUTPATIENT
Start: 2024-01-31 | End: 2024-02-01 | Stop reason: HOSPADM

## 2024-01-31 RX ADMIN — FLUOXETINE 40 MG: 20 CAPSULE ORAL at 08:42

## 2024-01-31 RX ADMIN — CYANOCOBALAMIN TAB 500 MCG 500 MCG: 500 TAB at 08:42

## 2024-01-31 RX ADMIN — MIRTAZAPINE 7.5 MG: 15 TABLET, FILM COATED ORAL at 21:23

## 2024-01-31 NOTE — SOCIAL WORK
1/31/2024     Team Meeting   Meeting Type Daily Rounds   Team Members Present   Team Members Present Physician;Nurse;Occupational Therapist;   Physician Team Member Dr. Mouna LINDSAY; MD; Dr. Jean Clark DO ; EDUARDO Mahtew   Nursing Team Member Velvet Carter RN   Social Work Team Member Nallely PASTRANA; JACOB Leon    OT Team Member    Patient/Family Present   Patient Present No   Patient's Family Present No   Pt ready to d/c home tomorrow with [facility] psych and therapy, pleasant, social, complaint, slept with PRN Remeron

## 2024-01-31 NOTE — SOCIAL WORK
"CM met with pt in his room. CM reviewed d/c plan and supports. Pt verbalized understanding and in agreement.  Pt states he feels safe to return home and to work.  Pt requested a note to return to work on Monday. CM explained that a note can only be provided for the days pt was IP. Pt verbalized understanding.  Pt declined pcp notification stating that he will be securing a new provider with his wife upon d/c.  Pt denies si/hi/avh/dep/anx stating that he feels \"good\" and is ready to d/c.    "

## 2024-01-31 NOTE — NURSING NOTE
"Pt is eager to d/c tomorrow, his wife is cheduled to pick him up at 1100. Pt states he feels ready to \"take on the world.\" He denies SI, HI, and A/VH's. \"I feel so much stronger than when I arrived. Coming to the hospital has been a good experience.\"  "

## 2024-01-31 NOTE — NURSING NOTE
Patient presents alert, cooperative and intermittently visible throughout the community. Patient denies SI/HI/SIB and A/VH at this time. No depression noted. No pain.   VS WNL. Patient c/o anxiety. Patient able to utilize coping skills to cope with anxiety. Consumed 100% of breakfast and lunch. Patient is compliant with medication administration without prompting. Maintained on safe precautions without incident. Staff provides therapeutic support, positive encouragement, and a structured routine. Patient will continue working on treatment goals. Will continue to monitor progress.

## 2024-01-31 NOTE — PLAN OF CARE
Problem: COPING  Goal: Will report anxiety at manageable levels  Description: INTERVENTIONS:  - Administer medication as ordered  - Teach and encourage coping skills  - Provide emotional support  - Assess patient/family for anxiety and ability to cope  Outcome: Progressing     Problem: Ineffective Coping  Goal: Participates in unit activities  Description: Interventions:  - Provide therapeutic environment   - Provide required programming   - Redirect inappropriate behaviors   Outcome: Progressing

## 2024-01-31 NOTE — PROGRESS NOTES
"Progress Note - Neville Moulton 34 y.o. male MRN: [id number]    Unit/Bed#: [facility] 258-01 Encounter: [id number]        Subjective:   Patient seen and examined at bedside after reviewing the chart and discussing the case with the caring staff.      Patient examined at bedside.  Patient has no acute symptoms.  He requested double vegetables with meals.    Patient is being discharged tomorrow, Thursday 2/1/2024.  Patient is requesting his prescriptions.  I reviewed and reconciled patient's problem list and medications.    Physical Exam   Vitals: Blood pressure 97/68, pulse 71, temperature (!) 96.9 °F (36.1 °C), temperature source Temporal, resp. rate 17, height 5' 10\" (1.778 m), weight 97.5 kg (215 lb), SpO2 98%.,Body mass index is 30.85 kg/m².  Constitutional:  Patient in no acute distress.  HEENT: PERR, EOMI, MMM.  Cardiovascular: Normal rate and regular rhythm.    Pulmonary/Chest: Effort normal and breath sounds normal.   Abdomen: Soft, + BS, NT.    Assessment/Plan:  Neville Moulton is a(n) 34 y.o. male with MDD.    Medical clearance.  Patient is medically cleared for discharge.  All scripts will be sent out for the patient.     1.  Asthma.  Stable.  Albuterol as needed.   2.  Migraine headaches.  Tylenol as needed.  3.  Hx gastritis.  Stable.  4.  Tobacco abuse.  NRT.  5.  Vitamin D deficiency.  Patient started on vitamin D bolus doses for 10 weeks followed by vitamin D3 1000 units daily.  6.  Vitamin B12 deficiency.  Patient started on vitamin B12 supplements.    The patient was discussed with Dr. Barroso and he is in agreement with the above note.  "

## 2024-01-31 NOTE — PROGRESS NOTES
"Progress Note - Behavioral Health   Neville Moulton 34 y.o. male MRN: [id number]  Unit/Bed#: [facility] 258-01 Encounter: [id number]    Assessment/Plan   Principal Problem:    Current severe episode of major depressive disorder without psychotic features without prior episode (HCC)  Active Problems:    Anxiety      Behavior over the last 24 hours: Improved  Sleep: Improved, PRN remeron effective  Appetite: normal  Medication side effects: No  ROS: no complaints and all other systems are negative    Neville has been visible, social, and participatory in milieu activities.  Presents as bright, calm, and cooperative.  Rates depression 1/10 and denies anxiety.  Has been maintaining safety and denies SI.  Verbalizes his daughter as strong protective factor against suicide.  Sleep improved with utilization of PRN remeron last evening.  Patient endorses no further crying spells, irritability, or difficulty with attention/concentration.  Mood controlled.  Verbalized satisfaction with medication regimen and denied any side effects.  States he is looking forward to returning to work and \"loves\" his job.    Mental Status Evaluation:  Appearance:  age appropriate, casually dressed, and well-groomed, tattooed, septum piercing   Behavior:  Pleasant, cooperative, good eye contact   Speech:  normal pitch and normal volume   Mood:  \"Good\"   Affect:  mood-congruent, redirectable, and bright   Thought Process:  goal directed and linear   Associations: intact associations   Thought Content:  No overt delusions or paranoia verbalized   Perceptual Disturbances: Denies AVH, did not appear internally preoccupied   Risk Potential: Suicidal Ideations none  Homicidal Ideations none  Potential for Aggression No   Sensorium:  person, place, time/date, and situation   Memory:  recent and remote memory grossly intact   Consciousness:  alert and awake    Attention: attention span and concentration were age appropriate   Insight:  Improving, fair "   Judgment: Improving, fair   Gait/Station: normal gait/station and normal balance   Motor Activity: no abnormal movements     Progress Toward Goals: Improving.  Mood controlled and reports resolution of anxiety.  Depression significantly improved with congruent affect.  Pleasant and cooperative.  Social with peers and presents as neatly groomed.  Denies SI and verbalizes adequate safety plan and protective factors against suicide.  Tolerating titration of Prozac 40 mg daily for treatment of MDD.  Remeron effective for sleep; will add Remeron 7.5 mg nightly since patient reports positive effect with sleep.  Plan is to discharge home 2/1/24 with outpatient psychiatric follow-up scheduled 2/19/24.    Recommended Treatment: Continue with group therapy, milieu therapy and occupational therapy.      Risks, benefits and possible side effects of Medications:   Risks, benefits, and possible side effects of medications explained to patient and patient verbalizes understanding.      Medications:   Add Remeron 7.5 mg PO QHS  all current active meds have been reviewed, continue current psychiatric medications, and current meds:   Current Facility-Administered Medications   Medication Dose Route Frequency    acetaminophen (TYLENOL) tablet 650 mg  650 mg Oral Q6H PRN    acetaminophen (TYLENOL) tablet 650 mg  650 mg Oral Q4H PRN    acetaminophen (TYLENOL) tablet 975 mg  975 mg Oral Q6H PRN    albuterol (PROVENTIL HFA,VENTOLIN HFA) inhaler 2 puff  2 puff Inhalation Q6H PRN    aluminum-magnesium hydroxide-simethicone (MAALOX) oral suspension 30 mL  30 mL Oral Q4H PRN    [START ON 3/31/2024] cholecalciferol (VITAMIN D3) tablet 1,000 Units  1,000 Units Oral Daily    cyanocobalamin (VITAMIN B-12) tablet 500 mcg  500 mcg Oral Daily    hydrOXYzine HCL (ATARAX) tablet 50 mg  50 mg Oral Q6H PRN Max 4/day    Or    diphenhydrAMINE (BENADRYL) injection 50 mg  50 mg Intramuscular Q6H PRN    ergocalciferol (VITAMIN D2) capsule 50,000 Units   50,000 Units Oral Weekly    FLUoxetine (PROzac) capsule 40 mg  40 mg Oral Daily    hydrOXYzine HCL (ATARAX) tablet 100 mg  100 mg Oral Q6H PRN Max 4/day    Or    LORazepam (ATIVAN) injection 2 mg  2 mg Intramuscular Q6H PRN    hydrOXYzine HCL (ATARAX) tablet 25 mg  25 mg Oral Q6H PRN Max 4/day    mirtazapine (REMERON) tablet 7.5 mg  7.5 mg Oral HS PRN    mirtazapine (REMERON) tablet 7.5 mg  7.5 mg Oral HS    nicotine polacrilex (NICORETTE) gum 4 mg  4 mg Oral Q2H PRN    OLANZapine (ZyPREXA) tablet 5 mg  5 mg Oral Q4H PRN Max 3/day    Or    OLANZapine (ZyPREXA) IM injection 2.5 mg  2.5 mg Intramuscular Q4H PRN Max 3/day    OLANZapine (ZyPREXA) tablet 5 mg  5 mg Oral Q3H PRN Max 3/day    Or    OLANZapine (ZyPREXA) IM injection 5 mg  5 mg Intramuscular Q3H PRN Max 3/day    OLANZapine (ZyPREXA) tablet 2.5 mg  2.5 mg Oral Q4H PRN Max 6/day    polyethylene glycol (MIRALAX) packet 17 g  17 g Oral Daily PRN   .    Labs: I have personally reviewed all pertinent laboratory/tests results. CBC:   Lab Results   Component Value Date    WBC 7.76 01/25/2024    RBC 5.20 01/25/2024    HGB 15.9 01/25/2024    HCT 47.0 01/25/2024    MCV 90 01/25/2024     01/25/2024    MCH 30.6 01/25/2024    MCHC 33.8 01/25/2024    RDW 12.1 01/25/2024    MPV 10.1 01/25/2024    NEUTROABS 3.55 01/25/2024     CMP:   Lab Results   Component Value Date    SODIUM 136 01/25/2024    K 4.0 01/25/2024     01/25/2024    CO2 29 01/25/2024    AGAP 5 01/25/2024    BUN 17 01/25/2024    CREATININE 0.80 01/25/2024    GLUC 93 01/25/2024    CALCIUM 9.5 01/25/2024    AST 14 01/25/2024    ALT 16 01/25/2024    ALKPHOS 48 01/25/2024    TP 7.6 01/25/2024    ALB 4.6 01/25/2024    TBILI 0.52 01/25/2024    EGFR 115 01/25/2024     Drug Screen:   Lab Results   Component Value Date    AMPMETHUR Negative 01/25/2024    BARBTUR Negative 01/25/2024    BDZUR Negative 01/25/2024    THCUR Positive (A) 01/25/2024    COCAINEUR Negative 01/25/2024    METHADONEUR Negative  01/25/2024    OPIATEUR Negative 01/25/2024    PCPUR Negative 01/25/2024     EKG   Lab Results   Component Value Date    VENTRATE 68 01/27/2024    ATRIALRATE 68 01/27/2024    PRINT 152 01/27/2024    QRSDINT 92 01/27/2024    QTINT 410 01/27/2024    QTCINT 435 01/27/2024    PAXIS 35 01/27/2024    QRSAXIS 43 01/27/2024    TWAVEAXIS 38 01/27/2024       Counseling / Coordination of Care  Total floor / unit time spent today 30 minutes. Greater than 50% of total time was spent with the patient and / or family counseling and / or coordination of care. A description of the counseling / coordination of care: Medication education, treatment plan, safety/discharge planning

## 2024-01-31 NOTE — SOCIAL WORK
CM received [facility] response to request in-person referral to [facility].  At this time, they do not offer in-person however the [facility] has availability. Pt in agreement.  Appt secured on 2/8 at 9am with Lakisha Canela for talk therapy and on 2/19 at 2pm with  for med management.

## 2024-01-31 NOTE — NURSING NOTE
Patient visible in dayroom. Bright on approach. Pleasant and cooperative. Social with staff and peers. Denies SI,HI,AVH, anxiety and depression. Safety checks continue Q 7 minutes.

## 2024-01-31 NOTE — SOCIAL WORK
CM spoke to pt spouse Cherrie [phone]. CM reviewed d/c plan and supports. Cherrie verbalized understanding and in agreement.  Cherrie stated she felt comfortable with pt returning home. Cherrie confirmed p/u tomorrow for d/c at 11am.

## 2024-01-31 NOTE — DISCHARGE INSTR - OTHER ORDERS
You are being discharged to your home at [address] [facility] [address] Telephone [phone]     Triggers you have identified during your hospitalization that led to your admission include ineffective coping skills and regression in mental health. Coping skills you have identified during your hospitalization include singing, cars, and spending time with your supportive family, and listening to music. If you are unable to deal with your distressed mood alone please contact your provider at [facility] at [phone]. If that is not effective and you continue to have (ex: suicidal ideation, homicidal ideation, distressed mood, overwhelmed, in crisis) please dial 235 or call the [facility] Crisis Hotline: [phone] dial 523, or go to the nearest emergency center.      *[facility] Crisis Hotline: 834.990.9044  * Alcohol Anonymous: 946.156.5229  *[facility] Drug and Alcohol Commision ([phone] 971-6990  *National [place] [employer]ness ([place]) HELPLINE: 947.108.4395/Website: www.[place].org  *Substance Abuse and Mental Health Services Administration([facility]) National Helpline, which is a confidential, free, 24-hour-a-day, 365-day-a-year, information service for individuals and family members facing mental health and/or substance use disorders. This service provides referrals to local treatment facilities, support groups, and community-based organizations. Callers can also order free publications and other information.  Call 1-480.918.3807/Website: www.[facility].gov  *[facility] 2-1-1: This is a toll free, confidential, 24-hour-a-day service which connects you to a community  in your area who can help you find services and resources that are available to you locally and provide critical services that can improve and save lives.  Call: 211  /Website: http://www.[employer].org/       Poornima, or Kerri, our Behavioral Health Nurse Navigators, will be calling you after your  discharge, on the phone number that you provided.  They will be available as an additional support, if needed.   If you wish to speak with one of them, you may contact Poornima at [phone] or Kerri at [phone].

## 2024-02-01 VITALS
SYSTOLIC BLOOD PRESSURE: 136 MMHG | TEMPERATURE: 97.5 F | DIASTOLIC BLOOD PRESSURE: 86 MMHG | OXYGEN SATURATION: 98 % | HEIGHT: 70 IN | BODY MASS INDEX: 30.78 KG/M2 | HEART RATE: 60 BPM | WEIGHT: 215 LBS | RESPIRATION RATE: 18 BRPM

## 2024-02-01 PROBLEM — G47.9 DIFFICULTY SLEEPING: Chronic | Status: RESOLVED | Noted: 2024-01-31 | Resolved: 2024-02-01

## 2024-02-01 PROBLEM — F32.2 CURRENT SEVERE EPISODE OF MAJOR DEPRESSIVE DISORDER WITHOUT PSYCHOTIC FEATURES WITHOUT PRIOR EPISODE (HCC): Status: RESOLVED | Noted: 2024-01-27 | Resolved: 2024-02-01

## 2024-02-01 PROBLEM — F41.9 ANXIETY: Status: RESOLVED | Noted: 2024-01-27 | Resolved: 2024-02-01

## 2024-02-01 PROCEDURE — 99238 HOSP IP/OBS DSCHRG MGMT 30/<: CPT | Performed by: NURSE PRACTITIONER

## 2024-02-01 RX ADMIN — CYANOCOBALAMIN TAB 500 MCG 500 MCG: 500 TAB at 08:24

## 2024-02-01 RX ADMIN — FLUOXETINE 40 MG: 20 CAPSULE ORAL at 08:25

## 2024-02-01 NOTE — PLAN OF CARE
Problem: DEATH & DYING  Goal: Pt/Family communicate acceptance of impending death and expresses psychological comfort and peace  Description: INTERVENTIONS:  - Assess patient/family anxiety and grief process related to end of life issues  - Provide emotional, spiritual and psychosocial support  - Provide information about the patient’s health status with consideration of family and cultural values  - Communicate willingness to discuss death and facilitate grief process  with patient/family as appropriate  - Emphasize sustaining relationships within family system and community, or arthur/spiritual traditions  - Initiate Spiritual Care, Pastoral care or other ancillary consults as needed  - Refer to community support groups as appropriate  Outcome: Progressing     Problem: COPING  Goal: Will report anxiety at manageable levels  Description: INTERVENTIONS:  - Administer medication as ordered  - Teach and encourage coping skills  - Provide emotional support  - Assess patient/family for anxiety and ability to cope  Outcome: Progressing     Problem: Ineffective Coping  Goal: Participates in unit activities  Description: Interventions:  - Provide therapeutic environment   - Provide required programming   - Redirect inappropriate behaviors   Outcome: Progressing

## 2024-02-01 NOTE — SOCIAL WORK
2/1/2024     Team Meeting   Meeting Type Daily Rounds   Team Members Present   Team Members Present Physician;Nurse;Occupational Therapist;   Physician Team Member Dr. Mouna LINDSAY; MD; Dr Jean Clark DO ; EDUARDO Mathew   Nursing Team Member Velvet Carter RN   Social Work Team Member Nallely PASTRANA; JACOB Leon    OT Team Member    Patient/Family Present   Patient Present No   Patient's Family Present No   Pt ready to d/c home today with [facility] f/u scheduled, med compliant, cooperative
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
## 2024-02-01 NOTE — BH TRANSITION RECORD
Contact Information: If you have any questions, concerns, pended studies, tests and/or procedures, or emergencies regarding your inpatient behavioral health visit. Please contact [facility] # [phone] and ask to speak to a , nurse or physician. A contact is available 24 hours/ 7 days a week at this number.     Summary of Procedures Performed During your Stay:  Below is a list of major procedures performed during your hospital stay and a summary of results:  - No major procedures performed.    Pending Studies (From admission, onward)      None          Please follow up on the above pending studies with your PCP and/or referring provider.

## 2024-02-01 NOTE — NURSING NOTE
Patient discharged home, [facility] where wife was waiting to drive him home. All d/c instructions reviewed with patient, all questions answered, pt stated understanding. Instructions taken with him along with belongings. Prescriptions e-scribed to pharmacy.

## 2024-02-01 NOTE — NURSING NOTE
Patient was visible in milieu ,calm cooperative and pleasant upon approach. Patient complied with medications and meals, denies SI,HI nor AVH. Staff maintained Q 7 safety checks, administered medications as prescribed,and monitored as needed.Staff will continue to monitor,support and encourage.

## 2024-02-01 NOTE — PROGRESS NOTES
"Progress Note - Neville Moulton 34 y.o. male MRN: [id number]    Unit/Bed#: [facility] 258-01 Encounter: [id number]        Subjective:   Patient seen and examined at bedside after reviewing the chart and discussing the case with the caring staff.      Patient examined at bedside.  Patient has no acute symptoms.     Patient is being discharged today, Thursday 2/1/2024.  Patient is requesting his prescriptions.  I reviewed and reconciled patient's problem list and medications.    Physical Exam   Vitals: Blood pressure 136/86, pulse 60, temperature 97.5 °F (36.4 °C), temperature source Temporal, resp. rate 18, height 5' 10\" (1.778 m), weight 97.5 kg (215 lb), SpO2 98%.,Body mass index is 30.85 kg/m².  Constitutional:  Patient in no acute distress.  HEENT: PERR, EOMI, MMM.  Cardiovascular: Normal rate and regular rhythm.    Pulmonary/Chest: Effort normal and breath sounds normal.   Abdomen: Soft, + BS, NT.    Assessment/Plan:  Neville Moulton is a(n) 34 y.o. male with MDD.    Medical clearance.  Patient is medically cleared for discharge.  All scripts will be sent out for the patient.     1.  Asthma.  Stable.  Albuterol as needed.   2.  Migraine headaches.  Tylenol as needed.  3.  Hx gastritis.  Stable.  4.  Tobacco abuse.  NRT.  5.  Vitamin D deficiency.  Patient started on vitamin D bolus doses for 10 weeks followed by vitamin D3 1000 units daily.  6.  Vitamin B12 deficiency.  Patient started on vitamin B12 supplements.    The patient was discussed with Dr. Barroso and he is in agreement with the above note.  "

## 2024-02-01 NOTE — PLAN OF CARE
Problem: PAIN - ADULT  Goal: Verbalizes/displays adequate comfort level or baseline comfort level  Description: Interventions:  - Encourage patient to monitor pain and request assistance  - Assess pain using appropriate pain scale  - Administer analgesics based on type and severity of pain and evaluate response  - Implement non-pharmacological measures as appropriate and evaluate response  - Consider cultural and social influences on pain and pain management  - Notify physician/advanced practitioner if interventions unsuccessful or patient reports new pain  2/1/2024 0911 by Candy Downing RN  Outcome: Completed  2/1/2024 0908 by Candy Downing RN  Outcome: Progressing     Problem: INFECTION - ADULT  Goal: Absence or prevention of progression during hospitalization  Description: INTERVENTIONS:  - Assess and monitor for signs and symptoms of infection  - Monitor lab/diagnostic results  - Monitor all insertion sites, i.e. indwelling lines, tubes, and drains  - Monitor endotracheal if appropriate and nasal secretions for changes in amount and color  - [place] appropriate cooling/warming therapies per order  - Administer medications as ordered  - Instruct and encourage patient and family to use good hand hygiene technique  - Identify and instruct in appropriate isolation precautions for identified infection/condition  2/1/2024 0911 by Candy Downing RN  Outcome: Completed  2/1/2024 0908 by Candy Downing RN  Outcome: Progressing  Goal: Absence of fever/infection during neutropenic period  Description: INTERVENTIONS:  - Monitor WBC    2/1/2024 0911 by Candy Downing RN  Outcome: Completed  2/1/2024 0908 by Candy Downing RN  Outcome: Progressing     Problem: SAFETY ADULT  Goal: Patient will remain free of falls  Description: INTERVENTIONS:  - Educate patient/family on patient safety including physical limitations  - Instruct patient to call for assistance with activity   - Consult OT/PT to assist  with strengthening/mobility   - Keep Call bell within reach  - Keep bed low and locked with side rails adjusted as appropriate  - Keep care items and personal belongings within reach  - Initiate and maintain comfort rounds  - Make Fall Risk Sign visible to staff  - Apply yellow socks and bracelet for high fall risk patients  - Consider moving patient to room near nurses station  2/1/2024 0911 by Candy Downing RN  Outcome: Completed  2/1/2024 0908 by Candy Downing RN  Outcome: Progressing  Goal: Maintain or return to baseline ADL function  Description: INTERVENTIONS:  -  Assess patient's ability to carry out ADLs; assess patient's baseline for ADL function and identify physical deficits which impact ability to perform ADLs (bathing, care of mouth/teeth, toileting, grooming, dressing, etc.)  - Assess/evaluate cause of self-care deficits   - Assess range of motion  - Assess patient's mobility; develop plan if impaired  - Assess patient's need for assistive devices and provide as appropriate  - Encourage maximum independence but intervene and supervise when necessary  - Involve family in performance of ADLs  - Assess for home care needs following discharge   - Consider OT consult to assist with ADL evaluation and planning for discharge  - Provide patient education as appropriate  2/1/2024 0911 by Candy Downing RN  Outcome: Completed  2/1/2024 0908 by Candy Downing RN  Outcome: Progressing  Goal: Maintains/Returns to pre admission functional level  Description: INTERVENTIONS:  - Perform AM-PAC 6 Click Basic Mobility/ Daily Activity assessment daily.  - Set and communicate daily mobility goal to care team and patient/family/caregiver.   - Collaborate with rehabilitation services on mobility goals if consulted  - Out of bed for toileting  - Record patient progress and toleration of activity level   2/1/2024 0911 by Candy Downing RN  Outcome: Completed  2/1/2024 0908 by Candy Downing RN  Outcome:  Progressing     Problem: DISCHARGE PLANNING  Goal: Discharge to home or other facility with appropriate resources  Description: INTERVENTIONS:  - Identify barriers to discharge w/patient and caregiver  - Arrange for needed discharge resources and transportation as appropriate  - Identify discharge learning needs (meds, wound care, etc.)  - Arrange for interpretive services to assist at discharge as needed  - Refer to Case Management Department for coordinating discharge planning if the patient needs post-hospital services based on physician/advanced practitioner order or complex needs related to functional status, cognitive ability, or social support system  2/1/2024 0911 by Candy Downing RN  Outcome: Completed  2/1/2024 0908 by Candy Downing RN  Outcome: Progressing     Problem: Knowledge Deficit  Goal: Patient/family/caregiver demonstrates understanding of disease process, treatment plan, medications, and discharge instructions  Description: Complete learning assessment and assess knowledge base.  Interventions:  - Provide teaching at level of understanding  - Provide teaching via preferred learning methods  2/1/2024 0911 by Candy Downing RN  Outcome: Completed  2/1/2024 0908 by Candy Downing RN  Outcome: Progressing     Problem: COPING  Goal: Pt/Family able to verbalize concerns and demonstrate effective coping strategies  Description: INTERVENTIONS:  - Assist patient/family to identify coping skills, available support systems and cultural and spiritual values  - Provide emotional support, including active listening and acknowledgement of concerns of patient and caregivers  - Reduce environmental stimuli, as able  - Provide patient education  - Assess for spiritual pain/suffering and initiate spiritual care, including notification of Pastoral Care or arthur based community as needed  - Assess effectiveness of coping strategies  2/1/2024 0911 by Candy Downing RN  Outcome: Completed  2/1/2024  0908 by Candy Downing RN  Outcome: Progressing  Goal: Will report anxiety at manageable levels  Description: INTERVENTIONS:  - Administer medication as ordered  - Teach and encourage coping skills  - Provide emotional support  - Assess patient/family for anxiety and ability to cope  2/1/2024 0911 by Candy Downing RN  Outcome: Completed  2/1/2024 0908 by Candy Downing RN  Outcome: Progressing     Problem: DEATH & DYING  Goal: Pt/Family communicate acceptance of impending death and expresses psychological comfort and peace  Description: INTERVENTIONS:  - Assess patient/family anxiety and grief process related to end of life issues  - Provide emotional, spiritual and psychosocial support  - Provide information about the patient’s health status with consideration of family and cultural values  - Communicate willingness to discuss death and facilitate grief process  with patient/family as appropriate  - Emphasize sustaining relationships within family system and community, or arthur/spiritual traditions  - Initiate Spiritual Care, Pastoral care or other ancillary consults as needed  - Refer to community support groups as appropriate  2/1/2024 0911 by Candy Downing RN  Outcome: Completed  2/1/2024 0908 by Candy Downing RN  Outcome: Progressing     Problem: Depression - IP adult  Goal: Effects of depression will be minimized  Description: INTERVENTIONS:  - Assess impact of patient's symptoms on level of functioning, self-care needs and offer support as indicated  - Assess patient/family knowledge of depression, impact on illness and need for teaching  - Provide emotional support, presence and reassurance  - Assess for possible suicidal thoughts, ideation or self-harm. If patient expresses suicidal thoughts or statements do not leave alone, notify physician/AP immediately, initiate Suicide Precautions, and determine need for continual observation.  - Initiate consults and referrals as appropriate (a  mental health professional, Spiritual Care)  - Administer medication as ordered  2/1/2024 0911 by Candy Downing RN  Outcome: Completed  2/1/2024 0908 by Candy Downing RN  Outcome: Progressing     Problem: Ineffective Coping  Goal: Participates in unit activities  Description: Interventions:  - Provide therapeutic environment   - Provide required programming   - Redirect inappropriate behaviors   2/1/2024 0911 by Candy Downing RN  Outcome: Completed  2/1/2024 0908 by Candy Downing RN  Outcome: Progressing     Problem: Nutrition/Hydration-ADULT  Goal: Nutrient/Hydration intake appropriate for improving, restoring or maintaining nutritional needs  Description: Monitor and assess patient's nutrition/hydration status for malnutrition. Collaborate with interdisciplinary team and initiate plan and interventions as ordered.  Monitor patient's weight and dietary intake as ordered or per policy. Utilize nutrition screening tool and intervene as necessary. Determine patient's food preferences and provide high-protein, high-caloric foods as appropriate.     INTERVENTIONS:  - Monitor oral intake, urinary output, labs, and treatment plans  - Assess nutrition and hydration status and recommend course of action  - Evaluate amount of meals eaten  - Assist patient with eating if necessary   - Allow adequate time for meals  - Recommend/ encourage appropriate diets, oral nutritional supplements, and vitamin/mineral supplements  - Order, calculate, and assess calorie counts as needed  - Recommend, monitor, and adjust tube feedings and TPN/PPN based on assessed needs  - Assess need for intravenous fluids  - Provide specific nutrition/hydration education as appropriate  - Include patient/family/caregiver in decisions related to nutrition  2/1/2024 0911 by Candy Downing RN  Outcome: Completed  2/1/2024 0908 by Candy Downing RN  Outcome: Progressing

## 2024-02-01 NOTE — PLAN OF CARE
Problem: PAIN - ADULT  Goal: Verbalizes/displays adequate comfort level or baseline comfort level  Description: Interventions:  - Encourage patient to monitor pain and request assistance  - Assess pain using appropriate pain scale  - Administer analgesics based on type and severity of pain and evaluate response  - Implement non-pharmacological measures as appropriate and evaluate response  - Consider cultural and social influences on pain and pain management  - Notify physician/advanced practitioner if interventions unsuccessful or patient reports new pain  Outcome: Progressing     Problem: INFECTION - ADULT  Goal: Absence or prevention of progression during hospitalization  Description: INTERVENTIONS:  - Assess and monitor for signs and symptoms of infection  - Monitor lab/diagnostic results  - Monitor all insertion sites, i.e. indwelling lines, tubes, and drains  - Monitor endotracheal if appropriate and nasal secretions for changes in amount and color  - [place] appropriate cooling/warming therapies per order  - Administer medications as ordered  - Instruct and encourage patient and family to use good hand hygiene technique  - Identify and instruct in appropriate isolation precautions for identified infection/condition  Outcome: Progressing  Goal: Absence of fever/infection during neutropenic period  Description: INTERVENTIONS:  - Monitor WBC    Outcome: Progressing     Problem: SAFETY ADULT  Goal: Patient will remain free of falls  Description: INTERVENTIONS:  - Educate patient/family on patient safety including physical limitations  - Instruct patient to call for assistance with activity   - Consult OT/PT to assist with strengthening/mobility   - Keep Call bell within reach  - Keep bed low and locked with side rails adjusted as appropriate  - Keep care items and personal belongings within reach  - Initiate and maintain comfort rounds  - Make Fall Risk Sign visible to staff  - Apply yellow socks and bracelet  for high fall risk patients  - Consider moving patient to room near nurses station  Outcome: Progressing  Goal: Maintain or return to baseline ADL function  Description: INTERVENTIONS:  -  Assess patient's ability to carry out ADLs; assess patient's baseline for ADL function and identify physical deficits which impact ability to perform ADLs (bathing, care of mouth/teeth, toileting, grooming, dressing, etc.)  - Assess/evaluate cause of self-care deficits   - Assess range of motion  - Assess patient's mobility; develop plan if impaired  - Assess patient's need for assistive devices and provide as appropriate  - Encourage maximum independence but intervene and supervise when necessary  - Involve family in performance of ADLs  - Assess for home care needs following discharge   - Consider OT consult to assist with ADL evaluation and planning for discharge  - Provide patient education as appropriate  Outcome: Progressing  Goal: Maintains/Returns to pre admission functional level  Description: INTERVENTIONS:  - Perform AM-PAC 6 Click Basic Mobility/ Daily Activity assessment daily.  - Set and communicate daily mobility goal to care team and patient/family/caregiver.   - Collaborate with rehabilitation services on mobility goals if consulted  - Out of bed for toileting  - Record patient progress and toleration of activity level   Outcome: Progressing     Problem: DISCHARGE PLANNING  Goal: Discharge to home or other facility with appropriate resources  Description: INTERVENTIONS:  - Identify barriers to discharge w/patient and caregiver  - Arrange for needed discharge resources and transportation as appropriate  - Identify discharge learning needs (meds, wound care, etc.)  - Arrange for interpretive services to assist at discharge as needed  - Refer to Case Management Department for coordinating discharge planning if the patient needs post-hospital services based on physician/advanced practitioner order or complex needs  related to functional status, cognitive ability, or social support system  Outcome: Progressing     Problem: Knowledge Deficit  Goal: Patient/family/caregiver demonstrates understanding of disease process, treatment plan, medications, and discharge instructions  Description: Complete learning assessment and assess knowledge base.  Interventions:  - Provide teaching at level of understanding  - Provide teaching via preferred learning methods  Outcome: Progressing     Problem: COPING  Goal: Pt/Family able to verbalize concerns and demonstrate effective coping strategies  Description: INTERVENTIONS:  - Assist patient/family to identify coping skills, available support systems and cultural and spiritual values  - Provide emotional support, including active listening and acknowledgement of concerns of patient and caregivers  - Reduce environmental stimuli, as able  - Provide patient education  - Assess for spiritual pain/suffering and initiate spiritual care, including notification of Pastoral Care or arthur based community as needed  - Assess effectiveness of coping strategies  Outcome: Progressing  Goal: Will report anxiety at manageable levels  Description: INTERVENTIONS:  - Administer medication as ordered  - Teach and encourage coping skills  - Provide emotional support  - Assess patient/family for anxiety and ability to cope  Outcome: Progressing     Problem: DEATH & DYING  Goal: Pt/Family communicate acceptance of impending death and expresses psychological comfort and peace  Description: INTERVENTIONS:  - Assess patient/family anxiety and grief process related to end of life issues  - Provide emotional, spiritual and psychosocial support  - Provide information about the patient’s health status with consideration of family and cultural values  - Communicate willingness to discuss death and facilitate grief process  with patient/family as appropriate  - Emphasize sustaining relationships within family system and  community, or arthur/spiritual traditions  - Initiate Spiritual Care, Pastoral care or other ancillary consults as needed  - Refer to community support groups as appropriate  Outcome: Progressing     Problem: Depression - IP adult  Goal: Effects of depression will be minimized  Description: INTERVENTIONS:  - Assess impact of patient's symptoms on level of functioning, self-care needs and offer support as indicated  - Assess patient/family knowledge of depression, impact on illness and need for teaching  - Provide emotional support, presence and reassurance  - Assess for possible suicidal thoughts, ideation or self-harm. If patient expresses suicidal thoughts or statements do not leave alone, notify physician/AP immediately, initiate Suicide Precautions, and determine need for continual observation.  - Initiate consults and referrals as appropriate (a mental health professional, Spiritual Care)  - Administer medication as ordered  Outcome: Progressing     Problem: Ineffective Coping  Goal: Participates in unit activities  Description: Interventions:  - Provide therapeutic environment   - Provide required programming   - Redirect inappropriate behaviors   Outcome: Progressing     Problem: Nutrition/Hydration-ADULT  Goal: Nutrient/Hydration intake appropriate for improving, restoring or maintaining nutritional needs  Description: Monitor and assess patient's nutrition/hydration status for malnutrition. Collaborate with interdisciplinary team and initiate plan and interventions as ordered.  Monitor patient's weight and dietary intake as ordered or per policy. Utilize nutrition screening tool and intervene as necessary. Determine patient's food preferences and provide high-protein, high-caloric foods as appropriate.     INTERVENTIONS:  - Monitor oral intake, urinary output, labs, and treatment plans  - Assess nutrition and hydration status and recommend course of action  - Evaluate amount of meals eaten  - Assist patient  with eating if necessary   - Allow adequate time for meals  - Recommend/ encourage appropriate diets, oral nutritional supplements, and vitamin/mineral supplements  - Order, calculate, and assess calorie counts as needed  - Recommend, monitor, and adjust tube feedings and TPN/PPN based on assessed needs  - Assess need for intravenous fluids  - Provide specific nutrition/hydration education as appropriate  - Include patient/family/caregiver in decisions related to nutrition  Outcome: Progressing

## 2024-02-02 NOTE — PROGRESS NOTES
02/01/24 0830   Activity/Group Checklist   Group   (Relapse Prevention Planning)   Attendance Attended   Attendance Duration (min) 0-15   Interactions Interacted appropriately   Affect/Mood Appropriate;Bright;Calm   Goals Achieved Identified feelings;Identified triggers;Identified relapse prevention strategies;Discussed safety plan;Discussed coping strategies;Discussed discharge plans     AT met with pt to discuss relapse prevention planning. PT appeared bright and motivated for discharge today. PT completed the RP plan and it is filed in pt chart.

## 2024-02-19 ENCOUNTER — OFFICE VISIT (OUTPATIENT)
Dept: PSYCHIATRY | Facility: CLINIC | Age: 34
End: 2024-02-19

## 2024-02-19 DIAGNOSIS — G47.9 DIFFICULTY SLEEPING: Chronic | ICD-10-CM

## 2024-02-19 DIAGNOSIS — F32.1 MDD (MAJOR DEPRESSIVE DISORDER), SINGLE EPISODE, MODERATE (HCC): ICD-10-CM

## 2024-02-19 DIAGNOSIS — F41.9 ANXIETY: ICD-10-CM

## 2024-02-19 RX ORDER — MIRTAZAPINE 7.5 MG/1
7.5 TABLET, FILM COATED ORAL
Qty: 30 TABLET | Refills: 1 | Status: SHIPPED | OUTPATIENT
Start: 2024-02-19 | End: 2024-04-19

## 2024-02-19 RX ORDER — FLUOXETINE HYDROCHLORIDE 40 MG/1
40 CAPSULE ORAL DAILY
Qty: 30 CAPSULE | Refills: 1 | Status: SHIPPED | OUTPATIENT
Start: 2024-02-19 | End: 2024-04-19

## 2024-02-19 NOTE — PATIENT INSTRUCTIONS
Treatment Recommendations/Precautions:  Be on lookout for therapy intake call to set up appointment  Continue Prozac 40mg daily  Continue remeron 7.5mg at night    Sleep hygiene tips:    Keep a consistent bedtime and wake up time.  This will lead to a more regular sleep schedule and avoid periods of sleep deprivation or periods of extended wakefulness during the night.    Avoid watching TV in bed.  If needing a form of media to help with sleep - can try sleep aid podcasts such Sleep With Me - a free podcast that helps with sleep initiation    Avoid napping, especially naps lasting longer than 1 hour or naps late in the day, which will likely affect your ability to fall asleep that night.    Limit caffeine, avoiding caffeine after lunch to allow it to get out of your system and not affect your ability to fall asleep or the quality of your sleep.  I usually recommend avoiding caffeine at least 6 hours before bedstime    Limit alcohol, alcohol can be sedating but also activating as it metabolizes, causing you to awaken from sleep earlier than desired.  It can affect the quality of your sleep by not letting you get into the more refreshing stages of sleep.    Avoid nicotine, of course not smoking or vaping at all is best, but nicotine is a stimulant and should be avoided near bedtime and during the night    Exercise and daytime physical activity is encouraged, in particular 4-6 hours before bedtime, as this may help you to fall asleep more easily and quality of sleep is improved.  Rigorous exercise within 3 hours of bedtime is discouraged.    Keep the sleep environment quiet and dark - Noise and light exposure during the night can disrupt sleep.  White noise or ear plugs are often recommended to reduce noise.  Using black out shades or an eye mask is commonly recommended to reduce light.  This also includes avoiding exposure to television or technology near bedtime, as this can have an impact on circadian rhythms by  "shifting sleep time later.    Bedroom clock - Avoid checking the time at night  This includes alarm clocks and other time pieces such as watches and phones.  Checking the time increases cognitive arousal and prolongs wakefulness.    Evening eating - Avoid a large meal near bedtime, but don't go to bed hungry.  Eat a healthy and filling meal in the evening without over-eating and avoid late night snacks.    Insomnia tips:  With great difficulty falling asleep or with difficulty falling back asleep, laying in bed for a prolonged period time is not ideal.  This can increase worry and rumination (having racing thoughts, not able to \"turn off your brain\".  After what feels like 15 minutes, if you feel wide awake, worried, anxious, or can't clear your brain, it is better to leave the bedroom to do something relaxing , The typical recommendation is to read a boring book in dim light.  In general, if you leave the room, you want to do something that is relaxing, not stimulating. Avoid bright screens, doing work, doing chores, or anything that requires a lot of mental effort. Return to bed when you feel more calm, sleepy, or mentally clear.      There are some on-line resources that do require a fee that can be of help.  Some of which will require a fee.    Http://www.veterantraining.[place].gov/apps/insomnia/index.html#dashboard (FREE)  Http://Moqom/cbt-online-insomnia-treatment.html  Http://www.Siminars/    Some apps that have helped people sleep:  Insomnia  (FREE)  CBT-I   Go! To Sleep by the [facility]  [employer]io    Please present for your previously scheduled appointment approximately 15 minutes prior to appointment time. If you are running late or are unable to attend your appointment, please call our [place] office at [phone] or our [place] office at [phone] if applicable to notify the office of your absence.    If you are in psychological crisis including not limited to " suicidal/homicidal thoughts or thoughts of self-injury, do not hesitate to call/contact your County Crisis hotline (see below) or attend to the nearest emergency department.  [facility] Crisis: [phone]  [facility] Crisis: [phone]  [facility]: [phone]  [facility] Crisis: [phone]  [facility] Crisis: [phone]  [facility] Crisis: [phone]  [facility] Crisis: [phone]  National Suicide Prevention Hotline: 1-843.569.1846

## 2024-02-19 NOTE — PSYCH
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  ml/min/1.73sq m Final    TSH 3RD GENERATON 01/25/2024 3.690  0.450 - 4.500 uIU/mL Final    Adult TSH (3rd generation) reference range follows the recommended guidelines of the American Thyroid Association, January, 2020.    Magnesium 01/25/2024 2.0  1.9 - 2.7 mg/dL Final    Amph/Meth UR 01/25/2024 Negative  Negative Final    Barbiturate Ur 01/25/2024 Negative  Negative Final    Benzodiazepine Urine 01/25/2024 Negative  Negative Final    Cocaine Urine 01/25/2024 Negative  Negative Final    Methadone Urine 01/25/2024 Negative  Negative Final    Opiate Urine 01/25/2024 Negative  Negative Final    PCP Ur 01/25/2024 Negative  Negative Final    THC Urine 01/25/2024 Positive (A)  Negative Final    Oxycodone Urine 01/25/2024 Negative  Negative Final    EXTBreath Alcohol 01/25/2024 0.000   Final    Color, UA 01/25/2024 Yellow   Final    Clarity, UA 01/25/2024 Clear   Final    pH, UA 01/25/2024 5.5  4.5 - 8.0 Final    Leukocytes, UA 01/25/2024 Negative  Negative Final    Nitrite, UA 01/25/2024 Negative  Negative Final    Protein, UA 01/25/2024 Trace (A)  Negative mg/dl Final    Glucose, UA 01/25/2024 Negative  Negative mg/dl Final    Ketones, UA 01/25/2024 Negative  Negative mg/dl Final    Urobilinogen, UA 01/25/2024 0.2  0.2, 1.0 E.U./dl E.U./dl Final     Bilirubin, UA 01/25/2024 Small (A)  Negative Final    Occult Blood, UA 01/25/2024 Negative  Negative Final    Specific Gravity, UA 01/25/2024 >=1.030  1.003 - 1.030 Final    RBC, UA 01/25/2024 None Seen  None Seen, 1-2 /hpf Final    WBC, UA 01/25/2024 2-4 (A)  None Seen, 1-2 /hpf Final    Epithelial Cells 01/25/2024 Occasional  None Seen, Occasional /hpf Final    Bacteria, UA 01/25/2024 Occasional  None Seen, Occasional /hpf Final    MUCUS THREADS 01/25/2024 Innumerable (A)  None Seen Final     Note Share:   This note was shared with patient.    Visit Time  Start: 2:00. Stop: 3:08.  I spent 68 minutes directly with the patient during this visit    Chance Amaya DO 02/19/24

## 2024-02-19 NOTE — BH CRISIS PLAN
Client Name: Neville Moulton       Client YOB: 1990    Mario Safety Plan      Creation Date: 2/19/24 Update Date: 2/19/24   Created By: Chance Amaya DO Last Updated By: Chance Amaya DO      Step 1: Warning Signs:   Warning Signs   sweating   heart racing/palpitations            Step 2: Internal Coping Strategies:   Internal Coping Strategies   breathing techniques   grounding techniques            Step 3: People and social settings that provide distraction:   Name Contact Information   cannot identify     Places   park/nature           Step 4: People whom I can ask for help during a crisis:      Name Contact Information    cannot identify       Step 5: Professionals or agencies I can contact during a crisis:      Clinican/Agency Name Phone Emergency Contact    [phone]          Crisis Phone Numbers:   Suicide Prevention Lifeline: Call or Text  382 Crisis Text Line: Text HOME to 141-433   Please note: Some [facility] do not have a separate number for Child/Adolescent specific crisis. If your county is not listed under Child/Adolescent, please call the adult number for your county      Adult Crisis Numbers: Child/Adolescent Crisis Numbers   [facility]: [phone] [facility]: [phone]   [facility]: [phone] [facility]: [phone]   [facility]: [phone] [place]: [phone]   [facility]: [phone] [place]: [phone]   [facility]: [phone]   [facility]: [phone]   [facility]: [phone]   [facility]: [phone] (daytime) [phone] (after hours, weekends, holidays)      Step 6: Making the environment safer (plan for lethal means safety):   Plan:  Has firearms but secured/locked.     Optional: What is most important to me and worth living for?   Family (daughter, etc.)     Mario Safety Plan. Arielle Giang and Arnaldo Hancock. Used with permission of the  authors.

## 2024-02-19 NOTE — BH TREATMENT PLAN
TREATMENT PLAN        [facility]    Name and Date of Birth:  Neville Moulton 34 y.o. 1990  Date of Treatment Plan: February 19, 2024  Diagnosis/Diagnoses:    1. MDD (major depressive disorder), single episode, moderate (HCC)    2. Anxiety    3. Difficulty sleeping        Strengths/Personal Resources for Self-Care: supportive family, taking medications as prescribed, ability to adapt to life changes, ability to communicate needs, ability to communicate well, ability to listen, ability to reason, good physical health, ability to negotiate basic needs, being resoureceful, sense of humor, special hobby/interest, stable employment, willingness to work on problems, work skills    Area/Areas of need: anxiety symptoms, depressive symptoms    Long Term Goal: improve control of depression & anxiety  Target Date: 6 months - August 19, 2024  Person/Persons responsible for completion of goal: Neville and Chance Amaya, DO     Short Term Objective (s) - How will we reach this goal?:   Take medications as prescribed  Attend psychiatry appointments regularly  Follow up with medical providers  Practice coping skills  Try coping skills using handouts provided  Avoid alcohol   Avoid drugs   Eat a healthy diet   Exercise more   Take walks regularly  Spend more time with friends and family  Try breathing exercises  Try relaxation techniques  Target Date: 3 months - May 19, 2024  Person/Persons Responsible for Completion of Goal: Neville     Progress Towards Goals: Continuing treatment    Treatment Modality: medication management every 1-3 months as needed  Review due 180 days from date of this plan: August 17, 2024   Expected length of service: Ongoing treatment    My physician and I have developed this plan together, and I agree to work on the goals and objectives. I understand the treatment goals that were developed for my treatment.    The treatment plan was created between  Chance Amaya DO and Neville Moulton on 02/19/24 at 3:29 PM

## 2024-02-20 ENCOUNTER — TELEPHONE (OUTPATIENT)
Dept: PSYCHIATRY | Facility: CLINIC | Age: 34
End: 2024-02-20

## 2024-02-20 NOTE — TELEPHONE ENCOUNTER
Writer Lvkrishna for patient to return call to reschedule missed np appointment. Please assist upon return call. Ty

## 2024-05-31 ENCOUNTER — DOCUMENTATION (OUTPATIENT)
Dept: PSYCHIATRY | Facility: CLINIC | Age: 34
End: 2024-05-31
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
## 2024-06-18 ENCOUNTER — TELEPHONE (OUTPATIENT)
Dept: PSYCHIATRY | Facility: CLINIC | Age: 34
End: 2024-06-18

## 2024-06-18 NOTE — TELEPHONE ENCOUNTER
DISCHARGE LETTER for Jose Luis (certified and regular) placed in outgoing mail on 06/18/24.    Article #:  [id number]      Address:  [address]  [place] [address]

## 2025-03-26 ENCOUNTER — OFFICE VISIT (OUTPATIENT)
Dept: INTERNAL MEDICINE CLINIC | Facility: CLINIC | Age: 35
End: 2025-03-26
Payer: COMMERCIAL

## 2025-03-26 VITALS
HEIGHT: 70 IN | SYSTOLIC BLOOD PRESSURE: 126 MMHG | WEIGHT: 216 LBS | BODY MASS INDEX: 30.92 KG/M2 | DIASTOLIC BLOOD PRESSURE: 80 MMHG | TEMPERATURE: 97.8 F | HEART RATE: 80 BPM | OXYGEN SATURATION: 99 %

## 2025-03-26 DIAGNOSIS — M54.42 ACUTE LEFT-SIDED LOW BACK PAIN WITH LEFT-SIDED SCIATICA: ICD-10-CM

## 2025-03-26 DIAGNOSIS — J45.20 MILD INTERMITTENT ASTHMA WITHOUT COMPLICATION: ICD-10-CM

## 2025-03-26 DIAGNOSIS — Z11.59 NEED FOR HEPATITIS C SCREENING TEST: ICD-10-CM

## 2025-03-26 DIAGNOSIS — K21.00 HIATAL HERNIA WITH GERD AND ESOPHAGITIS: ICD-10-CM

## 2025-03-26 DIAGNOSIS — M54.2 NECK PAIN: ICD-10-CM

## 2025-03-26 DIAGNOSIS — K44.9 HIATAL HERNIA WITH GERD AND ESOPHAGITIS: ICD-10-CM

## 2025-03-26 DIAGNOSIS — Z11.4 SCREENING FOR HIV (HUMAN IMMUNODEFICIENCY VIRUS): ICD-10-CM

## 2025-03-26 DIAGNOSIS — Z00.00 ANNUAL PHYSICAL EXAM: ICD-10-CM

## 2025-03-26 DIAGNOSIS — R73.01 IMPAIRED FASTING GLUCOSE: ICD-10-CM

## 2025-03-26 DIAGNOSIS — Z76.89 ENCOUNTER TO ESTABLISH CARE: Primary | ICD-10-CM

## 2025-03-26 DIAGNOSIS — F41.8 ANXIETY WITH DEPRESSION: ICD-10-CM

## 2025-03-26 PROBLEM — R10.13 ABDOMINAL PAIN, EPIGASTRIC: Status: ACTIVE | Noted: 2018-05-31

## 2025-03-26 PROBLEM — F17.200 TOBACCO USE DISORDER: Status: ACTIVE | Noted: 2022-09-23

## 2025-03-26 PROBLEM — Z72.0 TOBACCO USE: Status: ACTIVE | Noted: 2022-09-23

## 2025-03-26 PROBLEM — R10.13 ABDOMINAL PAIN, EPIGASTRIC: Status: RESOLVED | Noted: 2018-05-31 | Resolved: 2025-03-26

## 2025-03-26 PROBLEM — E55.9 VITAMIN D DEFICIENCY: Status: ACTIVE | Noted: 2022-09-23

## 2025-03-26 PROBLEM — K31.84 GASTROPARESIS: Status: ACTIVE | Noted: 2022-09-23

## 2025-03-26 PROBLEM — F41.9 ANXIETY: Status: RESOLVED | Noted: 2024-01-27 | Resolved: 2025-03-26

## 2025-03-26 PROBLEM — R11.2 NAUSEA AND VOMITING: Status: ACTIVE | Noted: 2018-05-31

## 2025-03-26 PROBLEM — K21.9 HIATAL HERNIA WITH GERD: Status: ACTIVE | Noted: 2022-09-23

## 2025-03-26 PROCEDURE — 99204 OFFICE O/P NEW MOD 45 MIN: CPT | Performed by: STUDENT IN AN ORGANIZED HEALTH CARE EDUCATION/TRAINING PROGRAM

## 2025-03-26 RX ORDER — MELOXICAM 15 MG/1
TABLET ORAL
Qty: 30 TABLET | Refills: 0 | Status: SHIPPED | OUTPATIENT
Start: 2025-03-26

## 2025-03-26 RX ORDER — ALBUTEROL SULFATE 90 UG/1
2 INHALANT RESPIRATORY (INHALATION) EVERY 6 HOURS PRN
Qty: 18 G | Refills: 0 | Status: SHIPPED | OUTPATIENT
Start: 2025-03-26

## 2025-03-26 NOTE — PROGRESS NOTES
INTERNAL MEDICINE OFFICE VISIT NOTE  [facility] [place]    NAME: Neville Moulton  AGE: 35 y.o. SEX: male    DATE OF ENCOUNTER:       Chief Complaint   Patient presents with    Establish Care     New pt    Left side of body     Pt states is c/o pain and numbness. per pt pain is on his neck that goes to his arm, leg and left side of body since 2 months ago.       Reports he has not been following with a PCP in more than 10 years    Previously diagnosed with, but not limited to:  Asthma, GERD, hiatal hernia, gastroparesis, tobacco use disorder, vitamin D deficiency, insomnia    Mental Health hx: Depression, anxiety    Significant FHx: Colon Ca - paternal uncle dx at age 40, NO Breast Ca, h/o DM m paternal GM  Shx: 3-4 cig. Started at age 26.   Vapes nicotine daily 2 -4 times a day.   Denies Marijuana or illicit drug use   Alcohol use: Denies   Environmental exposures: [employer]  Works - [employer]   Civil union,  Kids: 6 y/oF who lives with him and his spouse     Labs or imaging reports: Reviewed    Assessment & Plan  Encounter to establish care         Neck pain  Complains of progressively worsening neck pain for approximately 2 months.  Feels that pain radiates down his arms, left more than right.  At times feels sharp shooting sensation from his neck down to his elbow.  Denies any history of trauma, recent or distant.   Has never been evaluated for this.  Has not tried any medications or interventions.  Will proceed with a trial of meloxicam 15 mg daily for 1 week.  May extend up to 4 weeks if necessary  Advised to contact our office if pain persist beyond 4 weeks  X-ray cervical spine ordered      Orders:    XR spine cervical complete 4 or 5 vw non injury; Future    meloxicam (MOBIC) 15 mg tablet; 1 tablet by mouth daily after a meal    tiZANidine (ZANAFLEX) 4 mg tablet; 1 hour before bedtime allow 7 to 8 hours to sleep that night.    CBC; Future    Comprehensive metabolic panel;  Future    TSH, 3rd generation with Free T4 reflex; Future    Lipid Panel with Direct LDL reflex; Future    Acute left-sided low back pain with left-sided sciatica  Complains of progressively worsening left-sided lower back pain for approximately 2 months.  Feels that it radiates down his left leg.  Denies history of trauma, recent or distant.  There is mild tenderness in the left paraspinal region.  Positive straight leg raise test on the left.   Will proceed with meloxicam 15 mg daily and tizanidine 4 mg at bedtime, for 1 week.  May extend up to 4 weeks if necessary  X-ray lumbar spine ordered      Orders:    XR spine lumbar minimum 4 views non injury; Future    meloxicam (MOBIC) 15 mg tablet; 1 tablet by mouth daily after a meal    tiZANidine (ZANAFLEX) 4 mg tablet; 1 hour before bedtime allow 7 to 8 hours to sleep that night.    CBC; Future    Comprehensive metabolic panel; Future    TSH, 3rd generation with Free T4 reflex; Future    Lipid Panel with Direct LDL reflex; Future    Hiatal hernia with GERD and esophagitis  Noted on EGD on 6/2023 by [facility] -pathology is not available for review  Previously on a PPI but mentions he self discontinued this at some point last year.  Denies reflux symptoms, epigastric pain, dysphagia.   We can discuss this further during his next visit         Anxiety with depression  Reports he was previously following with psychiatry and at some point was on fluoxetine and mirtazapine for insomnia oriented to self discontinued since he felt these were not helping his symptoms  Reports more recently he has been coping well with his life stressors and feels that he is anxiety and depression are in remission  Not interested in restarting medication at this time  Will follow-up in future visits         Mild intermittent asthma without complication    Orders:    albuterol (PROVENTIL HFA,VENTOLIN HFA) 90 mcg/act inhaler; Inhale 2 puffs every 6 (six) hours as needed for wheezing    Need for  "hepatitis C screening test    Orders:    Hepatitis C Antibody; Future    Screening for HIV (human immunodeficiency virus)    Orders:    HIV 1/2 AG/AB w Reflex [facility] for 2 yr old and above; Future    Impaired fasting glucose    Orders:    CBC; Future    Comprehensive metabolic panel; Future    TSH, 3rd generation with Free T4 reflex; Future    Hemoglobin A1C; Future    Lipid Panel with Direct LDL reflex; Future    Annual physical exam    Orders:    CBC; Future    Comprehensive metabolic panel; Future    TSH, 3rd generation with Free T4 reflex; Future    Lipid Panel with Direct LDL reflex; Future      Return in about 4 weeks (around 4/23/2025) for Annual physical, Labs ordered to be completed before next visit, xray ordered.      OBJECTIVE:  Vitals:    03/26/25 1132   BP: 126/80   BP Location: Left arm   Patient Position: Sitting   Cuff Size: Large   Pulse: 80   Temp: 97.8 °F (36.6 °C)   SpO2: 99%   Weight: 98 kg (216 lb)   Height: 5' 10\" (1.778 m)         Physical Exam:   GENERAL: NAD, Normal appearance.    NEUROLOGIC:  Alert/oriented x3.  HEENT:  NC/AT, no scleral icterus  CARDIAC:  RRR, +S1/S2  PULMONARY:  non-labored breathing  MSK as above      Current Outpatient Medications:     albuterol (PROVENTIL HFA,VENTOLIN HFA) 90 mcg/act inhaler, Inhale 2 puffs every 6 (six) hours as needed for wheezing, Disp: 18 g, Rfl: 0    meloxicam (MOBIC) 15 mg tablet, 1 tablet by mouth daily after a meal, Disp: 30 tablet, Rfl: 0    tiZANidine (ZANAFLEX) 4 mg tablet, 1 hour before bedtime allow 7 to 8 hours to sleep that night., Disp: 30 tablet, Rfl: 0    ergocalciferol (VITAMIN D2) 50,000 units, Take 1 capsule (50,000 Units total) by mouth once a week for 9 doses, Disp: 8 capsule, Rfl: 0    Patient Active Problem List   Diagnosis    Anxiety with depression    Gastroparesis    Mild intermittent asthma without complication    Nausea and vomiting    Hiatal hernia with GERD and esophagitis    Tobacco use disorder    Vitamin D deficiency "    Neck pain    Acute left-sided low back pain with left-sided sciatica             Kieran Nichols MD  [facility] Internal [facility] [place]    * Please Note: This note was completed in part utilizing a dictation software.  Grammatical errors, random word insertions, spelling mistakes, and incomplete sentences may be an occasional consequence of this system secondary to software limitations, ambient noise, and hardware issues.  If you have any questions or concerns about the content, text, or information contained within the body of this dictation, please contact the provider for clarification.**

## 2025-03-27 NOTE — ASSESSMENT & PLAN NOTE
Noted on EGD on 6/2023 by [facility] -pathology is not available for review  Previously on a PPI but mentions he self discontinued this at some point last year.  Denies reflux symptoms, epigastric pain, dysphagia.   We can discuss this further during his next visit

## 2025-03-27 NOTE — ASSESSMENT & PLAN NOTE
Complains of progressively worsening neck pain for approximately 2 months.  Feels that pain radiates down his arms, left more than right.  At times feels sharp shooting sensation from his neck down to his elbow.  Denies any history of trauma, recent or distant.   Has never been evaluated for this.  Has not tried any medications or interventions.  Will proceed with a trial of meloxicam 15 mg daily for 1 week.  May extend up to 4 weeks if necessary  Advised to contact our office if pain persist beyond 4 weeks  X-ray cervical spine ordered      Orders:    XR spine cervical complete 4 or 5 vw non injury; Future    meloxicam (MOBIC) 15 mg tablet; 1 tablet by mouth daily after a meal    tiZANidine (ZANAFLEX) 4 mg tablet; 1 hour before bedtime allow 7 to 8 hours to sleep that night.    CBC; Future    Comprehensive metabolic panel; Future    TSH, 3rd generation with Free T4 reflex; Future    Lipid Panel with Direct LDL reflex; Future

## 2025-03-27 NOTE — ASSESSMENT & PLAN NOTE
Complains of progressively worsening left-sided lower back pain for approximately 2 months.  Feels that it radiates down his left leg.  Denies history of trauma, recent or distant.  There is mild tenderness in the left paraspinal region.  Positive straight leg raise test on the left.   Will proceed with meloxicam 15 mg daily and tizanidine 4 mg at bedtime, for 1 week.  May extend up to 4 weeks if necessary  X-ray lumbar spine ordered      Orders:    XR spine lumbar minimum 4 views non injury; Future    meloxicam (MOBIC) 15 mg tablet; 1 tablet by mouth daily after a meal    tiZANidine (ZANAFLEX) 4 mg tablet; 1 hour before bedtime allow 7 to 8 hours to sleep that night.    CBC; Future    Comprehensive metabolic panel; Future    TSH, 3rd generation with Free T4 reflex; Future    Lipid Panel with Direct LDL reflex; Future

## 2025-03-27 NOTE — ASSESSMENT & PLAN NOTE
Reports he was previously following with psychiatry and at some point was on fluoxetine and mirtazapine for insomnia oriented to self discontinued since he felt these were not helping his symptoms  Reports more recently he has been coping well with his life stressors and feels that he is anxiety and depression are in remission  Not interested in restarting medication at this time  Will follow-up in future visits

## 2025-04-23 ENCOUNTER — HOSPITAL ENCOUNTER (OUTPATIENT)
Dept: RADIOLOGY | Facility: HOSPITAL | Age: 35
Discharge: HOME | End: 2025-04-23
Payer: COMMERCIAL

## 2025-04-23 DIAGNOSIS — M54.42 ACUTE LEFT-SIDED LOW BACK PAIN WITH LEFT-SIDED SCIATICA: ICD-10-CM

## 2025-04-23 DIAGNOSIS — M54.2 NECK PAIN: ICD-10-CM

## 2025-04-23 PROCEDURE — 72050 X-RAY EXAM NECK SPINE 4/5VWS: CPT

## 2025-04-23 PROCEDURE — 72110 X-RAY EXAM L-2 SPINE 4/>VWS: CPT

## 2025-04-24 ENCOUNTER — OFFICE VISIT (OUTPATIENT)
Dept: INTERNAL MEDICINE CLINIC | Facility: CLINIC | Age: 35
End: 2025-04-24
Payer: COMMERCIAL

## 2025-04-24 VITALS
RESPIRATION RATE: 18 BRPM | TEMPERATURE: 97.5 F | HEIGHT: 70 IN | SYSTOLIC BLOOD PRESSURE: 124 MMHG | DIASTOLIC BLOOD PRESSURE: 78 MMHG | BODY MASS INDEX: 30.26 KG/M2 | HEART RATE: 71 BPM | WEIGHT: 211.4 LBS | OXYGEN SATURATION: 98 %

## 2025-04-24 DIAGNOSIS — M54.42 ACUTE LEFT-SIDED LOW BACK PAIN WITH LEFT-SIDED SCIATICA: ICD-10-CM

## 2025-04-24 DIAGNOSIS — M54.2 NECK PAIN: ICD-10-CM

## 2025-04-24 DIAGNOSIS — Z00.00 ANNUAL PHYSICAL EXAM: Primary | ICD-10-CM

## 2025-04-24 DIAGNOSIS — R20.2 ARM PARESTHESIA, RIGHT: ICD-10-CM

## 2025-04-24 DIAGNOSIS — K31.84 GASTROPARESIS: ICD-10-CM

## 2025-04-24 PROCEDURE — 99395 PREV VISIT EST AGE 18-39: CPT | Performed by: STUDENT IN AN ORGANIZED HEALTH CARE EDUCATION/TRAINING PROGRAM

## 2025-04-24 PROCEDURE — 99214 OFFICE O/P EST MOD 30 MIN: CPT | Performed by: STUDENT IN AN ORGANIZED HEALTH CARE EDUCATION/TRAINING PROGRAM

## 2025-04-24 RX ORDER — METHOCARBAMOL 750 MG/1
750 TABLET, FILM COATED ORAL EVERY 12 HOURS PRN
Qty: 60 TABLET | Refills: 0 | Status: SHIPPED | OUTPATIENT
Start: 2025-04-24

## 2025-04-24 NOTE — PATIENT INSTRUCTIONS
"Patient Education     Routine physical for adults   The Basics   Written by the doctors and editors at [facility]   What is a physical? -- A physical is a routine visit, or \"check-up,\" with your doctor. You might also hear it called a \"wellness visit\" or \"preventive visit.\"  During each visit, the doctor will:   Ask about your physical and mental health   Ask about your habits, behaviors, and lifestyle   Do an exam   Give you vaccines if needed   Talk to you about any medicines you take   Give advice about your health   Answer your questions  Getting regular check-ups is an important part of taking care of your health. It can help your doctor find and treat any problems you have. But it's also important for preventing health problems.  A routine physical is different from a \"sick visit.\" A sick visit is when you see a doctor because of a health concern or problem. Since physicals are scheduled ahead of time, you can think about what you want to ask the doctor.  How often should I get a physical? -- It depends on your age and health. In general, for people age 21 years and older:   If you are younger than 50 years, you might be able to get a physical every 3 years.   If you are 50 years or older, your doctor might recommend a physical every year.  If you have an ongoing health condition, like diabetes or high blood pressure, your doctor will probably want to see you more often.  What happens during a physical? -- In general, each visit will include:   Physical exam - The doctor or nurse will check your height, weight, heart rate, and blood pressure. They will also look at your eyes and ears. They will ask about how you are feeling and whether you have any symptoms that bother you.   Medicines - It's a good idea to bring a list of all the medicines you take to each doctor visit. Your doctor will talk to you about your medicines and answer any questions. Tell them if you are having any side effects that bother you. You " "should also tell them if you are having trouble paying for any of your medicines.   Habits and behaviors - This includes:   Your diet   Your exercise habits   Whether you smoke, drink alcohol, or use drugs   Whether you are sexually active   Whether you feel safe at home  Your doctor will talk to you about things you can do to improve your health and lower your risk of health problems. They will also offer help and support. For example, if you want to quit smoking, they can give you advice and might prescribe medicines. If you want to improve your diet or get more physical activity, they can help you with this, too.   Lab tests, if needed - The tests you get will depend on your age and situation. For example, your doctor might want to check your:   Cholesterol   Blood sugar   Iron level   Vaccines - The recommended vaccines will depend on your age, health, and what vaccines you already had. Vaccines are very important because they can prevent certain serious or deadly infections.   Discussion of screening - \"Screening\" means checking for diseases or other health problems before they cause symptoms. Your doctor can recommend screening based on your age, risk, and preferences. This might include tests to check for:   Cancer, such as breast, prostate, cervical, ovarian, colorectal, prostate, lung, or skin cancer   Sexually transmitted infections, such as chlamydia and gonorrhea   Mental health conditions like depression and anxiety  Your doctor will talk to you about the different types of screening tests. They can help you decide which screenings to have. They can also explain what the results might mean.   Answering questions - The physical is a good time to ask the doctor or nurse questions about your health. If needed, they can refer you to other doctors or specialists, too.  Adults older than 65 years often need other care, too. As you get older, your doctor will talk to you about:   How to prevent falling at " home   Hearing or vision tests   Memory testing   How to take your medicines safely   Making sure that you have the help and support you need at home  All topics are updated as new evidence becomes available and our peer review process is complete.  This topic retrieved from [employer] on: May 02, 2024.  Topic 249950 Version 1.0  Release: 32.4.3 - C32.122  © 2024 UpToDate, Inc. and/or its affiliates. All rights reserved.  Consumer Information Use and Disclaimer   Disclaimer: This generalized information is a limited summary of diagnosis, treatment, and/or medication information. It is not meant to be comprehensive and should be used as a tool to help the user understand and/or assess potential diagnostic and treatment options. It does NOT include all information about conditions, treatments, medications, side effects, or risks that may apply to a specific patient. It is not intended to be medical advice or a substitute for the medical advice, diagnosis, or treatment of a health care provider based on the health care provider's examination and assessment of a patient's specific and unique circumstances. Patients must speak with a health care provider for complete information about their health, medical questions, and treatment options, including any risks or benefits regarding use of medications. This information does not endorse any treatments or medications as safe, effective, or approved for treating a specific patient. UpToDate, Inc. and its affiliates disclaim any warranty or liability relating to this information or the use thereof.The use of this information is governed by the Terms of Use, available at https://www.woltersCE Interactiveuwer.com/en/know/clinical-effectiveness-terms. 2024© UpToDate, Inc. and its affiliates and/or licensors. All rights reserved.  Copyright   © 2024 UpToDate, Inc. and/or its affiliates. All rights reserved.

## 2025-04-24 NOTE — ASSESSMENT & PLAN NOTE
Reports he continues to experience neck pain with radiation down his arms, left more than right.  Continues to experience sharp shooting sensation from his neck down to his left arm  Reports he is self-discontinued tizanidine after a week since he felt it was not helping his symptoms.  Has been intermittently taking meloxicam  Recent cervical spine x-ray showed preservation of the intervertebral spaces with patent neuroforamina and prevertebral soft tissues within normal limits.  It does show strengthening of the normal expected cervical lordosis  We have agreed on referral to physical therapy  He prefers to defer referral to spine and pain management due to his busy work schedule and prefers to focus on physical therapy for now  We will continue meloxicam 15 mg daily  We will transition him from tizanidine to methocarbamol every 12 hours as needed  We will also send for an EMG study  We will follow-up with him in approximately 6 weeks  Orders:    Ambulatory Referral to Physical Therapy; Future    methocarbamol (Robaxin-750) 750 mg tablet; Take 1 tablet (750 mg total) by mouth every 12 (twelve) hours as needed for muscle spasms (neck/shoulder pain)    EMG; Future

## 2025-04-24 NOTE — PROGRESS NOTES
Adult Annual Physical  Name: Neville Moulton      : 1990      MRN: [id number]  Encounter Provider: Kieran Nichols MD  Encounter Date: 2025   Encounter department: [facility] INTERNAL MEDICINE [place]    :  Assessment & Plan  Annual physical exam  Nutrition, exercise, sleep, hearing vision and dental health reviewed  Preventive medicine reviewed    Neck pain  Reports he continues to experience neck pain with radiation down his arms, left more than right.  Continues to experience sharp shooting sensation from his neck down to his left arm  Reports he is self-discontinued tizanidine after a week since he felt it was not helping his symptoms.  Has been intermittently taking meloxicam  Recent cervical spine x-ray showed preservation of the intervertebral spaces with patent neuroforamina and prevertebral soft tissues within normal limits.  It does show strengthening of the normal expected cervical lordosis  We have agreed on referral to physical therapy  He prefers to defer referral to spine and pain management due to his busy work schedule and prefers to focus on physical therapy for now  We will continue meloxicam 15 mg daily  We will transition him from tizanidine to methocarbamol every 12 hours as needed  We will also send for an EMG study  We will follow-up with him in approximately 6 weeks  Orders:    Ambulatory Referral to Physical Therapy; Future    methocarbamol (Robaxin-750) 750 mg tablet; Take 1 tablet (750 mg total) by mouth every 12 (twelve) hours as needed for muscle spasms (neck/shoulder pain)    EMG; Future    Arm paresthesia, right    Orders:    EMG; Future    Acute left-sided low back pain with left-sided sciatica  Reports he continues to experience intermittent pain in his lower back that radiates down his left leg.  Reports he is self-discontinued tizanidine after a week since he felt it was not helping his symptoms.  Has been intermittently taking meloxicam  Recent X-ray of his lumbar  "spine revealed : \"Slight leftward thoracolumbar curvature and Age-indeterminate slight anterior wedging at T12\"   We discussed a referral to physical therapy and comprehensive spine   He is agreeable to a referral to physical therapy however we have agreed on deferring a referral to comprehensive spine considering that he mentions his busy work schedule  We have agreed on continuing meloxicam 15 mg daily and transitioning him from tizanidine to methocarbamol 750 mg every 12 hours as needed  We will schedule him for follow-up in approximately 6 weeks         Gastroparesis  Reports a long history of gastroparesis that often causes him to experience nausea and at times vomiting  Has been following with gastroenterology however he prefers to defer this at this time due to his busy work schedule considering that he wants to focus on attending physical therapy for his neck and lower back pain  Will follow-up in future visits             Preventive Screenings:    - Prostate cancer screening: screening not indicated     Immunizations:  - Immunizations due: Influenza and Tdap         History of Present Illness     Adult Annual Physical:  Patient presents for annual physical.     Diet and Physical Activity:  - Diet/Nutrition: well balanced diet. specialized  - Exercise: no formal exercise.    Depression Screening:    - PHQ-9 Score: 0    General Health:  - Sleep: sleeps poorly and 4-6 hours of sleep on average.  - Hearing: normal hearing bilateral ears.  - Vision: no vision problems and most recent eye exam < 1 year ago.  - Dental: brushes teeth twice daily and floss regularly.     Health:  - History of STDs: no.   - Urinary symptoms: none.     Advanced Care Planning:  - Has an advanced directive?: no    - Has a durable medical POA?: no    - ACP document given to patient?: no      Review of Systems      Objective   /78 (BP Location: Left arm, Patient Position: Sitting, Cuff Size: Standard)   Pulse 71   Temp 97.5 °F " "(36.4 °C) (Temporal)   Resp 18   Ht 5' 10\" (1.778 m)   Wt 95.9 kg (211 lb 6.4 oz)   SpO2 98%   BMI 30.33 kg/m²     Physical Exam    "

## 2025-04-25 NOTE — ASSESSMENT & PLAN NOTE
"Reports he continues to experience intermittent pain in his lower back that radiates down his left leg.  Reports he is self-discontinued tizanidine after a week since he felt it was not helping his symptoms.  Has been intermittently taking meloxicam  Recent X-ray of his lumbar spine revealed : \"Slight leftward thoracolumbar curvature and Age-indeterminate slight anterior wedging at T12\"   We discussed a referral to physical therapy and comprehensive spine   He is agreeable to a referral to physical therapy however we have agreed on deferring a referral to comprehensive spine considering that he mentions his busy work schedule  We have agreed on continuing meloxicam 15 mg daily and transitioning him from tizanidine to methocarbamol 750 mg every 12 hours as needed  We will schedule him for follow-up in approximately 6 weeks         "

## 2025-04-28 LAB
ALBUMIN SERPL-MCNC: 4.4 G/DL (ref 3.5–5.7)
ALP SERPL-CCNC: 42 U/L (ref 35–120)
ALT SERPL-CCNC: 14 U/L
ANION GAP SERPL CALCULATED.3IONS-SCNC: 7 MMOL/L (ref 3–11)
AST SERPL-CCNC: 14 U/L
BILIRUB SERPL-MCNC: 0.8 MG/DL (ref 0.2–1)
BUN SERPL-MCNC: 14 MG/DL (ref 7–28)
CALCIUM SERPL-MCNC: 9.7 MG/DL (ref 8.5–10.5)
CHLORIDE SERPL-SCNC: 102 MMOL/L (ref 100–109)
CHOLEST SERPL-MCNC: 177 MG/DL
CHOLEST/HDLC SERPL: 4.1 {RATIO}
CO2 SERPL-SCNC: 30 MMOL/L (ref 21–31)
CREAT SERPL-MCNC: 0.8 MG/DL (ref 0.53–1.3)
CYTOLOGY CMNT CVX/VAG CYTO-IMP: ABNORMAL
ERYTHROCYTE [DISTWIDTH] IN BLOOD BY AUTOMATED COUNT: 13.3 % (ref 12–16)
EST. AVERAGE GLUCOSE BLD GHB EST-MCNC: 126 MG/DL
GFR/BSA.PRED SERPLBLD CYS-BASED-ARV: 117 ML/MIN/{1.73_M2}
GLUCOSE SERPL-MCNC: 100 MG/DL (ref 65–99)
HBA1C MFR BLD: 6 %
HCT VFR BLD AUTO: 47.1 % (ref 37–48)
HDLC SERPL-MCNC: 43 MG/DL (ref 23–92)
HGB BLD-MCNC: 16.2 G/DL (ref 12.5–17)
LDLC SERPL CALC-MCNC: 114 MG/DL
MCH RBC QN AUTO: 31.3 PG (ref 27–36)
MCHC RBC AUTO-ENTMCNC: 34.4 G/DL (ref 32–37)
MCV RBC AUTO: 91 FL (ref 80–100)
NONHDLC SERPL-MCNC: 134 MG/DL
PLATELET # BLD AUTO: 229 THOU/CMM (ref 140–350)
PMV BLD REES-ECKER: 8.7 FL (ref 7.5–11.3)
POTASSIUM SERPL-SCNC: 4.1 MMOL/L (ref 3.5–5.2)
PROT SERPL-MCNC: 6.7 G/DL (ref 6.3–8.3)
RBC # BLD AUTO: 5.19 MILL/CMM (ref 4–5.4)
SODIUM SERPL-SCNC: 139 MMOL/L (ref 135–145)
TRIGL SERPL-MCNC: 99 MG/DL
TSH SERPL-ACNC: 2.77 UIU/ML (ref 0.45–5.33)
WBC # BLD AUTO: 6 THOU/CMM (ref 4–10.5)

## 2025-04-29 DIAGNOSIS — M54.2 NECK PAIN: ICD-10-CM

## 2025-04-29 DIAGNOSIS — M54.42 ACUTE LEFT-SIDED LOW BACK PAIN WITH LEFT-SIDED SCIATICA: ICD-10-CM

## 2025-04-29 RX ORDER — MELOXICAM 15 MG/1
TABLET ORAL
Qty: 30 TABLET | Refills: 0 | OUTPATIENT
Start: 2025-04-29